# Patient Record
Sex: MALE | Race: BLACK OR AFRICAN AMERICAN
[De-identification: names, ages, dates, MRNs, and addresses within clinical notes are randomized per-mention and may not be internally consistent; named-entity substitution may affect disease eponyms.]

---

## 2017-10-27 ENCOUNTER — HOSPITAL ENCOUNTER (INPATIENT)
Dept: HOSPITAL 72 - EMR | Age: 82
LOS: 4 days | Discharge: NURSING, REHAB OR LONG-TERM CARE | DRG: 872 | End: 2017-10-31
Payer: MEDICARE

## 2017-10-27 VITALS — SYSTOLIC BLOOD PRESSURE: 119 MMHG | DIASTOLIC BLOOD PRESSURE: 71 MMHG

## 2017-10-27 VITALS — BODY MASS INDEX: 22.51 KG/M2 | WEIGHT: 152 LBS | HEIGHT: 69 IN

## 2017-10-27 DIAGNOSIS — E78.5: ICD-10-CM

## 2017-10-27 DIAGNOSIS — L03.115: ICD-10-CM

## 2017-10-27 DIAGNOSIS — F03.90: ICD-10-CM

## 2017-10-27 DIAGNOSIS — Z79.02: ICD-10-CM

## 2017-10-27 DIAGNOSIS — L03.116: ICD-10-CM

## 2017-10-27 DIAGNOSIS — A41.9: Primary | ICD-10-CM

## 2017-10-27 DIAGNOSIS — L98.8: ICD-10-CM

## 2017-10-27 DIAGNOSIS — H91.90: ICD-10-CM

## 2017-10-27 DIAGNOSIS — I73.9: ICD-10-CM

## 2017-10-27 DIAGNOSIS — I11.9: ICD-10-CM

## 2017-10-27 PROCEDURE — 99285 EMERGENCY DEPT VISIT HI MDM: CPT

## 2017-10-27 PROCEDURE — 93970 EXTREMITY STUDY: CPT

## 2017-10-27 PROCEDURE — 93925 LOWER EXTREMITY STUDY: CPT

## 2017-10-27 PROCEDURE — 85025 COMPLETE CBC W/AUTO DIFF WBC: CPT

## 2017-10-27 PROCEDURE — 36415 COLL VENOUS BLD VENIPUNCTURE: CPT

## 2017-10-27 PROCEDURE — 80048 BASIC METABOLIC PNL TOTAL CA: CPT

## 2017-10-27 PROCEDURE — 82550 ASSAY OF CK (CPK): CPT

## 2017-10-27 PROCEDURE — 87040 BLOOD CULTURE FOR BACTERIA: CPT

## 2017-10-27 PROCEDURE — 84484 ASSAY OF TROPONIN QUANT: CPT

## 2017-10-27 PROCEDURE — 74176 CT ABD & PELVIS W/O CONTRAST: CPT

## 2017-10-27 PROCEDURE — 83605 ASSAY OF LACTIC ACID: CPT

## 2017-10-27 PROCEDURE — 85730 THROMBOPLASTIN TIME PARTIAL: CPT

## 2017-10-27 PROCEDURE — 71010: CPT

## 2017-10-27 PROCEDURE — 80053 COMPREHEN METABOLIC PANEL: CPT

## 2017-10-27 PROCEDURE — 93005 ELECTROCARDIOGRAM TRACING: CPT

## 2017-10-27 PROCEDURE — 81003 URINALYSIS AUTO W/O SCOPE: CPT

## 2017-10-27 PROCEDURE — 80202 ASSAY OF VANCOMYCIN: CPT

## 2017-10-27 PROCEDURE — 78266 GSTR EMPT IMG SM BWL&COLON: CPT

## 2017-10-27 PROCEDURE — 85610 PROTHROMBIN TIME: CPT

## 2017-10-27 PROCEDURE — 82553 CREATINE MB FRACTION: CPT

## 2017-10-28 VITALS — DIASTOLIC BLOOD PRESSURE: 72 MMHG | SYSTOLIC BLOOD PRESSURE: 128 MMHG

## 2017-10-28 VITALS — SYSTOLIC BLOOD PRESSURE: 132 MMHG | DIASTOLIC BLOOD PRESSURE: 88 MMHG

## 2017-10-28 VITALS — DIASTOLIC BLOOD PRESSURE: 85 MMHG | SYSTOLIC BLOOD PRESSURE: 129 MMHG

## 2017-10-28 VITALS — SYSTOLIC BLOOD PRESSURE: 135 MMHG | DIASTOLIC BLOOD PRESSURE: 59 MMHG

## 2017-10-28 VITALS — DIASTOLIC BLOOD PRESSURE: 48 MMHG | SYSTOLIC BLOOD PRESSURE: 119 MMHG

## 2017-10-28 VITALS — SYSTOLIC BLOOD PRESSURE: 109 MMHG | DIASTOLIC BLOOD PRESSURE: 52 MMHG

## 2017-10-28 LAB
ADD MANUAL DIFF: NO
ALBUMIN SERPL-MCNC: 2.5 G/DL (ref 3.4–5)
ALBUMIN/GLOB SERPL: 0.5 {RATIO} (ref 1–2.7)
ALP SERPL-CCNC: 85 U/L (ref 46–116)
ALT SERPL-CCNC: 30 U/L (ref 12–78)
ANION GAP SERPL CALC-SCNC: 11 MMOL/L (ref 5–15)
APPEARANCE UR: (no result)
APTT BLD: 31 SEC (ref 23–33)
APTT PPP: YELLOW S
AST SERPL-CCNC: 21 U/L (ref 15–37)
BASOPHILS NFR BLD AUTO: 1.5 % (ref 0–2)
BILIRUB SERPL-MCNC: 0.6 MG/DL (ref 0.2–1)
BUN SERPL-MCNC: 1 MG/DL (ref 7–18)
CALCIUM SERPL-MCNC: 9.4 MG/DL (ref 8.5–10.1)
CHLORIDE SERPL-SCNC: 107 MMOL/L (ref 98–107)
CK MB SERPL-MCNC: < 0.5 NG/ML (ref 0–3.6)
CK SERPL-CCNC: 28 U/L (ref 26–308)
CO2 SERPL-SCNC: 26 MMOL/L (ref 21–32)
CREAT SERPL-MCNC: 1.8 MG/DL (ref 0.55–1.3)
EOSINOPHIL NFR BLD AUTO: 2.7 % (ref 0–3)
ERYTHROCYTE [DISTWIDTH] IN BLOOD BY AUTOMATED COUNT: 13.1 % (ref 11.6–14.8)
GLOBULIN SER-MCNC: 5.4 G/DL
GLUCOSE UR STRIP-MCNC: NEGATIVE MG/DL
HCT VFR BLD CALC: 43.3 % (ref 42–52)
HGB BLD-MCNC: 15.2 G/DL (ref 14.2–18)
INR PPP: 1 (ref 0.9–1.1)
KETONES UR QL STRIP: (no result)
LEUKOCYTE ESTERASE UR QL STRIP: (no result)
LYMPHOCYTES NFR BLD AUTO: 32 % (ref 20–45)
MCV RBC AUTO: 89 FL (ref 80–99)
MONOCYTES NFR BLD AUTO: 7.2 % (ref 1–10)
NEUTROPHILS NFR BLD AUTO: 56.6 % (ref 45–75)
NITRITE UR QL STRIP: NEGATIVE
PH UR STRIP: 5 [PH] (ref 4.5–8)
PLATELET # BLD: 319 K/UL (ref 150–450)
POTASSIUM SERPL-SCNC: 4.1 MMOL/L (ref 3.5–5.1)
PROT UR QL STRIP: (no result)
RBC # BLD AUTO: 4.88 M/UL (ref 4.7–6.1)
SODIUM SERPL-SCNC: 143 MMOL/L (ref 136–145)
SP GR UR STRIP: 1.02 (ref 1–1.03)
UROBILINOGEN UR-MCNC: 1 MG/DL (ref 0–1)
WBC # BLD AUTO: 11.3 K/UL (ref 4.8–10.8)

## 2017-10-28 RX ADMIN — DOCUSATE SODIUM SCH MG: 100 CAPSULE, LIQUID FILLED ORAL at 09:00

## 2017-10-28 RX ADMIN — DOCUSATE SODIUM SCH MG: 100 CAPSULE, LIQUID FILLED ORAL at 17:20

## 2017-10-28 RX ADMIN — ASPIRIN SCH MG: 81 TABLET, DELAYED RELEASE ORAL at 09:00

## 2017-10-28 RX ADMIN — HEPARIN SODIUM SCH UNITS: 5000 INJECTION INTRAVENOUS; SUBCUTANEOUS at 09:00

## 2017-10-28 RX ADMIN — MEMANTINE HYDROCHLORIDE SCH MG: 10 TABLET ORAL at 09:00

## 2017-10-28 RX ADMIN — DONEPEZIL HYDROCHLORIDE SCH MG: 10 TABLET, FILM COATED ORAL at 09:00

## 2017-10-28 RX ADMIN — HEPARIN SODIUM SCH UNITS: 5000 INJECTION INTRAVENOUS; SUBCUTANEOUS at 20:53

## 2017-10-28 NOTE — DIAGNOSTIC IMAGING REPORT
Indication: Abdominal pain



Technique: Spiral acquisitions obtained through the abdomen and pelvis. No oral

contrast utilized, per emergency room physician request No IV contrast utilized,

per referring physician request.. Multiplanar reconstructions were generated. Total

dose length product 630 mGycm. CTDIvol(s) 12 mGy. Dose reduction achieved using

automated exposure control





Comparison: None



Findings: There is extensive colonic diverticulosis. No evidence of 

diverticulitis.

Normal appendix. There is a lower mid abdominal small bowel small bowel 

anastomosis,

associated loops are dilated. No other small bowel distention demonstrated. There is

a broad-based umbilical or incisional hernia centrally. No free or 

loculated

intraperitoneal air or fluid. Distal esophagus, stomach, duodenum are unremarkable.

There is a small fat-containing left inguinal hernia.



The gallbladder contains a gallstone. There is no biliary ductal dilatation. Lack of

IV contrast limits assessment of the solid organs. The liver, pancreas, spleen,

adrenals right kidney are unremarkable. Left kidney demonstrates a 2 mm

nonobstructive upper pole calyceal calculus. No ureteral calculi. No retroperitoneal

or mesenteric mass or adenopathy. No pelvic mass or adenopathy.



The included lung bases demonstrate some basilar scarring. There are incompletely

united subacute appearing left ninth and 10th rib fracture deformities. There 

are

degenerative spondylosis changes. There are atherosclerotic vascular calcifications



Impression: No acute abnormality



Diverticulosis. No evidence of diverticulitis



Evidence of prior small bowel surgery. Focal dilatation at the anastomosis,

presumably due to focal altered motility related to the prior bowel resection



Cholelithiasis



Nonobstructive left upper pole intrarenal calyceal calculus



Subacute incompletely healed left rib fractures



Other findings as noted, including atherosclerosis, basilar pulmonary 

parenchymal

scarring, degenerative spondylosis, midline incisional/umbilical fat-containing

hernias, left inguinal fat-containing hernia



This agrees with the preliminary interpretation provided overnight by 

[facility]

teleradiology service.



The CT scanner at [facility] is accredited by the American College 

of

Radiology and the scans are performed using protocols designed to limit 

radiation

exposure to as low as reasonably achievable to attain images of sufficient

resolution adequate for diagnostic evaluation.

## 2017-10-28 NOTE — DIAGNOSTIC IMAGING REPORT
Indication: SOB



Technique: One view of the chest



Comparison: none



Findings: Lungs and pleural spaces are clear. Heart size is normal



Impression: No acute process

## 2017-10-28 NOTE — EMERGENCY ROOM REPORT
History of Present Illness


General


Chief Complaint:  Fever


Source:  Medical Record





Present Illness


HPI


This is an 85-year-old male with multiple medical problem.  He resides in a 

nursing home.  He was brought in for chief complaint of fever and abdominal 

pain with nausea vomiting.  Onset today.  History is from the nursing home 

note.  Patient has history of dementia so history is limited.  He received 

Tylenol for his fever.  No mention of diarrhea.


Allergies:  


Coded Allergies:  


     No Known Allergies (Unverified , 10/27/17)





Patient History


Past Medical History:  see triage record, old chart reviewed


Past Surgical History:  other


Pertinent Family History:  none


Social History:  Denies: smoking


Immunizations:  other


Reviewed Nursing Documentation:  PMH: Agreed, PSxH: Agreed





Nursing Documentation-PMH


Hx Hypertension:  Yes - Hyperlipidemia





Review of Systems


Constitutional:  Reports: fever


Gastrointestinal:  Reports: abdominal pain, nausea, vomiting


All Other Systems:  limited - Secondary to dementia





Physical Exam





Vital Signs








  Date Time  Temp Pulse Resp B/P (MAP) Pulse Ox O2 Delivery O2 Flow Rate FiO2


 


10/27/17 22:59 100.0 96 16 119/71 98 Room Air  





vitals with fever


Sp02 EP Interpretation:  reviewed, normal


General Appearance:  well appearing, no apparent distress, alert


Head:  normocephalic, atraumatic


Eyes:  bilateral eye PERRL, bilateral eye EOMI


ENT:  hearing grossly normal, normal pharynx


Neck:  full range of motion, supple, no meningismus


Respiratory:  chest non-tender, lungs clear, normal breath sounds


Cardiovascular #1:  regular rate, rhythm, no murmur


Gastrointestinal:  normal bowel sounds, non tender, no mass, no organomegaly, 

no bruit, non-distended


Musculoskeletal:  back normal, normal range of motion


Neurologic:  alert


Psychiatric:  mood/affect normal


Skin:  warm/dry





Medical Decision Making


Diagnostic Impression:  


 Primary Impression:  


 Fever


 Qualified Codes:  R50.9 - Fever, unspecified


 Additional Impressions:  


 SIRS (systemic inflammatory response syndrome)


 Proteinuria


 Qualified Codes:  R80.9 - Proteinuria, unspecified


ER Course


Patient presents with a fever and systemic inflammatory response syndrome.  No 

clear source but most likely urine.  Initially urine is equivocal.  I will go 

ahead and put him on antibiotics.  Mental status.  He is at baseline.  No 

evidence of pneumonia, acute abdomen to name a few.


Lab Results Impression


labs unremarkable


EKG Diagnostic Results


Rate:  normal


Rhythm:  NSR


ST Segments:  no acute changes





Rhythm Strip Diag. Results


Rhythm Strip Time:  00:01


EP Interpretation:  yes


Rate:  77


Rhythm:  NSR, no PVC's, no ectopy





Chest X-Ray Diagnostic Results


Chest X-Ray Diagnostic Results :  


   Chest X-Ray Ordered:  Yes


   # of Views/Limited/Complete:  1 View


   Indication:  Shortness of Breath


   EP Interpretation:  Yes


   Interpretation:  no consolidation, no effusion, no pneumothorax


   Impression:  No acute disease


   Electronically Signed by:  Electronically signed by Janak Espinal MD





CT/MRI/US Diagnostic Results


CT/MRI/US Diagnostic Results :  


   Imaging Test Ordered:  CT abdomen and pelvis


   Impression


Read by radiologist.  No acute process.





Last Vital Signs








  Date Time  Temp Pulse Resp B/P (MAP) Pulse Ox O2 Delivery O2 Flow Rate FiO2


 


10/27/17 22:59 100.0 96 16 119/71 98 Room Air  








Status:  improved


Disposition:  ADMITTED AS INPATIENT


Condition:  Serious











JANAK ESPINAL M.D. Oct 28, 2017 00:02

## 2017-10-28 NOTE — HISTORY AND PHYSICAL REPORT
DATE OF ADMISSION:  10/28/2017



CHIEF COMPLAINT:  Fever.



HISTORY OF PRESENT ILLNESS:  This is an 85-year-old male, admitted from

[facility].  The patient developed a fever in the nursing home.  The

patient is a poor historian due to organic brain syndrome and profound

deafness.  The patient just returned from another admission several weeks

ago at [facility] where he had failure to

thrive and poor oral intake.  He was noted to have severe peripheral

vascular disease and left lower extremity cellulitis.  The patient

underwent the angioplasty to his left femoral arterial system with good

results.



PAST MEDICAL HISTORY:

1. Organic brain syndrome.

2. Hypertensive cardiovascular disease.

3. Deafness.

4. Hyperlipidemia.



MEDICATIONS:  Tylenol p.r.n., baby aspirin, Plavix, Depakote, sodium

docusate, Aricept, subcutaneous heparin, lorazepam p.r.n., magnesium

oxide, Namenda, multivitamins, Protonix, Zosyn, pravastatin, and

vancomycin.



ALLERGIES:  No known drug allergies.



SOCIAL HISTORY:  Unable to obtain secondary to mental status.



FAMILY HISTORY:  Unable to obtain secondary to mental status.



REVIEW OF SYSTEMS:  Unable to obtain secondary to mental status.



PHYSICAL EXAMINATION:

GENERAL:  This is an elderly  male, who is in no acute

distress.

VITAL SIGNS:  Blood pressure 129/85, pulse 76 and regular, respirations 14,

and temperature initially 100 degrees and later on today 98.7 degrees.

HEENT:  Head is normocephalic and atraumatic.  Pupils are equal, round, and

reactive to light and accommodation consensually.

NECK:  Supple.  Trachea midline.  There was no lymphadenopathy or

thyromegaly.

LUNGS:  Clear to auscultation and percussion.

HEART:  Regular rate and rhythm without rubs, murmurs, or gallops.

ABDOMEN:  Soft and nontender.  Bowel sounds were active.

EXTREMITIES:  No clubbing.  He has bilateral lower extremity edema.

NEUROLOGICAL:  He is confused.  He is extremely hard of hearing.  There are

no gross lateralizing signs.



LABORATORY AND ANCILLARY DATA:  White count 11,300, otherwise, within

normal limits.  Serum chemistry, creatinine 1.8.  Albumin 2.5.

Urinalysis, 3+ protein, otherwise unremarkable.



ASSESSMENT:  Fever, unknown etiology.



Of note is that a CT scan of abdomen shows gallstones.



PLAN:

1. Broad-spectrum IV antibiotics.

2. Rule out C. difficile colitis.

3. HIDA scan.

4. Consider ID consult.









  ______________________________________________

  Dat Greene M.D.





DR:  Tonio

D:  10/28/2017 12:38

T:  10/28/2017 22:46

JOB#:  [id number]

CC:

## 2017-10-28 NOTE — DIAGNOSTIC IMAGING REPORT
Indication: Abdominal pain



Technique: Spiral acquisitions obtained through the abdomen and pelvis. No oral

contrast utilized, per emergency room physician request No IV contrast utilized,

per referring physician request.. Multiplanar reconstructions were generated. Total

dose length product 630 mGycm. CTDIvol(s) 12 mGy. Dose reduction achieved using

automated exposure control





Comparison: None



Findings: There is extensive colonic diverticulosis. No evidence of 

diverticulitis.

Normal appendix. There is a lower mid abdominal small bowel small bowel 

anastomosis,

associated loops are dilated. No other small bowel distention demonstrated. There is

a broad-based umbilical or incisional hernia centrally. No free or 

loculated

intraperitoneal air or fluid. Distal esophagus, stomach, duodenum are unremarkable.

There is a small fat-containing left inguinal hernia.



The gallbladder contains a gallstone. There is no biliary ductal dilatation. Lack of

IV contrast limits assessment of the solid organs. The liver, pancreas, spleen,

adrenals right kidney are unremarkable. Left kidney demonstrates a 2 mm

nonobstructive upper pole calyceal calculus. No ureteral calculi. No retroperitoneal

or mesenteric mass or adenopathy. No pelvic mass or adenopathy.



The included lung bases demonstrate some basilar scarring. There are incompletely

united subacute appearing left ninth and 10th rib fracture deformities. There 

are

degenerative spondylosis changes. There are atherosclerotic vascular calcifications



Impression: No acute abnormality



Diverticulosis. No evidence of diverticulitis



Evidence of prior small bowel surgery. Focal dilatation at the anastomosis,

presumably due to focal altered motility related to the prior bowel resection



Cholelithiasis



Nonobstructive left upper pole intrarenal calyceal calculus



Subacute incompletely healed left rib fractures



Other findings as noted, including atherosclerosis, basilar pulmonary 

parenchymal

scarring, degenerative spondylosis, midline incisional/umbilical fat-containing

hernias, left inguinal fat-containing hernia



This agrees with the preliminary interpretation provided overnight by 

[facility]

teleradiology service.



The CT scanner at [facility] is accredited by the American College 

of

Radiology and the scans are performed using protocols designed to limit 

radiation

exposure to as low as reasonably achievable to attain images of sufficient

resolution adequate for diagnostic evaluation.

## 2017-10-28 NOTE — HISTORY AND PHYSICAL REPORT
DATE OF ADMISSION:  10/28/2017



CHIEF COMPLAINT:  Fever.



HISTORY OF PRESENT ILLNESS:  This is an 85-year-old male, admitted from

[facility].  The patient developed a fever in the nursing home.  The

patient is a poor historian due to organic brain syndrome and profound

deafness.  The patient just returned from another admission several weeks

ago at [facility] where he had failure to

thrive and poor oral intake.  He was noted to have severe peripheral

vascular disease and left lower extremity cellulitis.  The patient

underwent the angioplasty to his left femoral arterial system with good

results.



PAST MEDICAL HISTORY:

1. Organic brain syndrome.

2. Hypertensive cardiovascular disease.

3. Deafness.

4. Hyperlipidemia.



MEDICATIONS:  Tylenol p.r.n., baby aspirin, Plavix, Depakote, sodium

docusate, Aricept, subcutaneous heparin, lorazepam p.r.n., magnesium

oxide, Namenda, multivitamins, Protonix, Zosyn, pravastatin, and

vancomycin.



ALLERGIES:  No known drug allergies.



SOCIAL HISTORY:  Unable to obtain secondary to mental status.



FAMILY HISTORY:  Unable to obtain secondary to mental status.



REVIEW OF SYSTEMS:  Unable to obtain secondary to mental status.



PHYSICAL EXAMINATION:

GENERAL:  This is an elderly  male, who is in no acute

distress.

VITAL SIGNS:  Blood pressure 129/85, pulse 76 and regular, respirations 14,

and temperature initially 100 degrees and later on today 98.7 degrees.

HEENT:  Head is normocephalic and atraumatic.  Pupils are equal, round, and

reactive to light and accommodation consensually.

NECK:  Supple.  Trachea midline.  There was no lymphadenopathy or

thyromegaly.

LUNGS:  Clear to auscultation and percussion.

HEART:  Regular rate and rhythm without rubs, murmurs, or gallops.

ABDOMEN:  Soft and nontender.  Bowel sounds were active.

EXTREMITIES:  No clubbing.  He has bilateral lower extremity edema.

NEUROLOGICAL:  He is confused.  He is extremely hard of hearing.  There are

no gross lateralizing signs.



LABORATORY AND ANCILLARY DATA:  White count 11,300, otherwise, within

normal limits.  Serum chemistry, creatinine 1.8.  Albumin 2.5.

Urinalysis, 3+ protein, otherwise unremarkable.



ASSESSMENT:  Fever, unknown etiology.



Of note is that a CT scan of abdomen shows gallstones.



PLAN:

1. Broad-spectrum IV antibiotics.

2. Rule out C. difficile colitis.

3. HIDA scan.

4. Consider ID consult.









  ______________________________________________

  Dat Greene M.D.





DR:  Tonio

D:  10/28/2017 12:38

T:  10/28/2017 22:46

JOB#:  [id number]

CC:

## 2017-10-28 NOTE — DIAGNOSTIC IMAGING REPORT
Indication: Abdominal pain



Technique: Spiral acquisitions obtained through the abdomen and pelvis. No oral

contrast utilized, per emergency room physician request No IV contrast utilized,

per referring physician request.. Multiplanar reconstructions were generated. Total

dose length product 630 mGycm. CTDIvol(s) 12 mGy. Dose reduction achieved using

automated exposure control





Comparison: None



Findings: There is extensive colonic diverticulosis. No evidence of 

diverticulitis.

Normal appendix. There is a lower mid abdominal small bowel small bowel 

anastomosis,

associated loops are dilated. No other small bowel distention demonstrated. There is

a broad-based umbilical or incisional hernia centrally. No free or 

loculated

intraperitoneal air or fluid. Distal esophagus, stomach, duodenum are unremarkable.

There is a small fat-containing left inguinal hernia.



The gallbladder contains a gallstone. There is no biliary ductal dilatation. Lack of

IV contrast limits assessment of the solid organs. The liver, pancreas, spleen,

adrenals right kidney are unremarkable. Left kidney demonstrates a 2 mm

nonobstructive upper pole calyceal calculus. No ureteral calculi. No retroperitoneal

or mesenteric mass or adenopathy. No pelvic mass or adenopathy.



The included lung bases demonstrate some basilar scarring. There are incompletely

united subacute appearing left ninth and 10th rib fracture deformities. There 

are

degenerative spondylosis changes. There are atherosclerotic vascular calcifications



Impression: No acute abnormality



Diverticulosis. No evidence of diverticulitis



Evidence of prior small bowel surgery. Focal dilatation at the anastomosis,

presumably due to focal altered motility related to the prior bowel resection



Cholelithiasis



Nonobstructive left upper pole intrarenal calyceal calculus



Subacute incompletely healed left rib fractures



Other findings as noted, including atherosclerosis, basilar pulmonary 

parenchymal

scarring, degenerative spondylosis, midline incisional/umbilical fat-containing

hernias, left inguinal fat-containing hernia



This agrees with the preliminary interpretation provided overnight by 

[facility]

teleradiology service.



The CT scanner at [facility] is accredited by the American College 

of

Radiology and the scans are performed using protocols designed to limit 

radiation

exposure to as low as reasonably achievable to attain images of sufficient

resolution adequate for diagnostic evaluation.

## 2017-10-28 NOTE — HISTORY AND PHYSICAL REPORT
DATE OF ADMISSION:  10/28/2017



CHIEF COMPLAINT:  Fever.



HISTORY OF PRESENT ILLNESS:  This is an 85-year-old male, admitted from

[facility].  The patient developed a fever in the nursing home.  The

patient is a poor historian due to organic brain syndrome and profound

deafness.  The patient just returned from another admission several weeks

ago at [facility] where he had failure to

thrive and poor oral intake.  He was noted to have severe peripheral

vascular disease and left lower extremity cellulitis.  The patient

underwent the angioplasty to his left femoral arterial system with good

results.



PAST MEDICAL HISTORY:

1. Organic brain syndrome.

2. Hypertensive cardiovascular disease.

3. Deafness.

4. Hyperlipidemia.



MEDICATIONS:  Tylenol p.r.n., baby aspirin, Plavix, Depakote, sodium

docusate, Aricept, subcutaneous heparin, lorazepam p.r.n., magnesium

oxide, Namenda, multivitamins, Protonix, Zosyn, pravastatin, and

vancomycin.



ALLERGIES:  No known drug allergies.



SOCIAL HISTORY:  Unable to obtain secondary to mental status.



FAMILY HISTORY:  Unable to obtain secondary to mental status.



REVIEW OF SYSTEMS:  Unable to obtain secondary to mental status.



PHYSICAL EXAMINATION:

GENERAL:  This is an elderly  male, who is in no acute

distress.

VITAL SIGNS:  Blood pressure 129/85, pulse 76 and regular, respirations 14,

and temperature initially 100 degrees and later on today 98.7 degrees.

HEENT:  Head is normocephalic and atraumatic.  Pupils are equal, round, and

reactive to light and accommodation consensually.

NECK:  Supple.  Trachea midline.  There was no lymphadenopathy or

thyromegaly.

LUNGS:  Clear to auscultation and percussion.

HEART:  Regular rate and rhythm without rubs, murmurs, or gallops.

ABDOMEN:  Soft and nontender.  Bowel sounds were active.

EXTREMITIES:  No clubbing.  He has bilateral lower extremity edema.

NEUROLOGICAL:  He is confused.  He is extremely hard of hearing.  There are

no gross lateralizing signs.



LABORATORY AND ANCILLARY DATA:  White count 11,300, otherwise, within

normal limits.  Serum chemistry, creatinine 1.8.  Albumin 2.5.

Urinalysis, 3+ protein, otherwise unremarkable.



ASSESSMENT:  Fever, unknown etiology.



Of note is that a CT scan of abdomen shows gallstones.



PLAN:

1. Broad-spectrum IV antibiotics.

2. Rule out C. difficile colitis.

3. HIDA scan.

4. Consider ID consult.









  ______________________________________________

  Dat Greene M.D.





DR:  Tonio

D:  10/28/2017 12:38

T:  10/28/2017 22:46

JOB#:  [id number]

CC:

## 2017-10-29 VITALS — DIASTOLIC BLOOD PRESSURE: 65 MMHG | SYSTOLIC BLOOD PRESSURE: 135 MMHG

## 2017-10-29 VITALS — SYSTOLIC BLOOD PRESSURE: 145 MMHG | DIASTOLIC BLOOD PRESSURE: 71 MMHG

## 2017-10-29 VITALS — DIASTOLIC BLOOD PRESSURE: 60 MMHG | SYSTOLIC BLOOD PRESSURE: 136 MMHG

## 2017-10-29 VITALS — SYSTOLIC BLOOD PRESSURE: 134 MMHG | DIASTOLIC BLOOD PRESSURE: 64 MMHG

## 2017-10-29 VITALS — DIASTOLIC BLOOD PRESSURE: 53 MMHG | SYSTOLIC BLOOD PRESSURE: 109 MMHG

## 2017-10-29 VITALS — DIASTOLIC BLOOD PRESSURE: 66 MMHG | SYSTOLIC BLOOD PRESSURE: 123 MMHG

## 2017-10-29 RX ADMIN — DONEPEZIL HYDROCHLORIDE SCH MG: 10 TABLET, FILM COATED ORAL at 08:14

## 2017-10-29 RX ADMIN — SODIUM CHLORIDE SCH MLS/HR: 0.9 INJECTION INTRAVENOUS at 11:30

## 2017-10-29 RX ADMIN — DOCUSATE SODIUM SCH MG: 100 CAPSULE, LIQUID FILLED ORAL at 08:14

## 2017-10-29 RX ADMIN — DOCUSATE SODIUM SCH MG: 100 CAPSULE, LIQUID FILLED ORAL at 17:31

## 2017-10-29 RX ADMIN — ASPIRIN SCH MG: 81 TABLET, DELAYED RELEASE ORAL at 08:14

## 2017-10-29 RX ADMIN — HEPARIN SODIUM SCH UNITS: 5000 INJECTION INTRAVENOUS; SUBCUTANEOUS at 20:59

## 2017-10-29 RX ADMIN — MEMANTINE HYDROCHLORIDE SCH MG: 10 TABLET ORAL at 08:13

## 2017-10-29 RX ADMIN — HEPARIN SODIUM SCH UNITS: 5000 INJECTION INTRAVENOUS; SUBCUTANEOUS at 08:19

## 2017-10-29 NOTE — CONSULTATION
DATE OF CONSULTATION:  10/29/2017



INFECTIOUS DISEASES CONSULTATION



ATTENDING PHYSICIAN:  Dat Greene M.D.



REASON FOR CONSULTATION:  Bilateral leg cellulitis, right greater than

left, possible sepsis and SIRS, fevers, and leukocytosis.  The patient's

chief complaint coming into the hospital is sepsis.



HISTORY OF PRESENT ILLNESS:  This is an 85-year-old male who has a history

of organic brain syndrome, is hard of hearing, and the patient is not a

very good historian.  The patient presents from an outside facility, who

lives in nursing home with poor oral intake and failure to thrive.  The

patient was noted to have leukocytosis and fevers and elevated heart rate

over 90 and respiratory rate of 20, possible sepsis.  Clinically, the

patient has bilateral leg cellulitis, especially on the right leg.

Infectious Diseases consultation requested.  The patient was placed on

vancomycin and cefepime.  The patient does also have an elevated

creatinine.  MAR was noted.  Orders noted.  Notes were reviewed.

Urinalysis only had 0-2 white blood cells, and chest x-ray showed no acute

process.  Also, the patient had CT scan of the abdomen and pelvis, which

showed diverticulosis, but no diverticulitis, history of cholelithiasis.

 



PAST MEDICAL HISTORY:  The patient's medical history includes the

following.  The patient has a past medical history of organic brain

syndrome, history of hypertension, and hypertensive cardiovascular

disease.  He has deafness, hyperlipidemia.  He has elevated creatinine at

this time.  He has possible acute kidney injury versus chronic kidney

disease.  No history of diabetes mentioned.  Also, hypertension and

hyperlipidemia.  Please see past medical history in medical order.



MEDICATIONS:  Upon reviewing the MAR, he is on the following medications.

He is on vancomycin and cefepime.  He is on pravastatin.  He is on

heparin.  He is on aspirin.  He is on Plavix, divalproex, Colace, Aricept,

Mylanta.  He is on Namenda, multivitamins, Protonix, Tylenol, magnesium

hydroxide, and Ativan.



ALLERGIES:  No known drug or antibiotic allergies.



SOCIAL HISTORY:  Negative for smoking, alcohol, or drug abuse.



FAMILY HISTORY:  Noncontributory.  Negative for exposure to tuberculosis or

cancer.



REVIEW OF SYSTEMS:  CONSTITUTIONAL:  Denies weakness or fatigue.  He is

hard of hearing.  He has organic brain syndrome.  He did come in with

fevers.  No chills noted at this time.  HEAD AND NECK:  No head pain.  No

neck pain.  No neck stiffness.  CARDIAC:  No chest pain.

GASTROINTESTINAL:  No nausea, vomiting, or diarrhea.    GENITOURINARY:  No

Sr.  PULMONARY:  No significant congestion, short of breath,

secretions, or hemoptysis.    SKIN:  No rash or itching.  EXTREMITIES:  He

has bilateral leg swelling and pain.  NEUROLOGIC:  No seizures.



PHYSICAL EXAMINATION:

GENERAL:  Alert and responsive.  He is hard of hearing.

VITAL SIGNS:  Temperature is 97.9 degrees, pulse rate 87, respiratory rate

20, blood pressure 109/53, respiratory rate has been as high as 22, pulse

rate has been as high as 96, and temperature has been high as 100

degrees.

HEAD AND NECK:  Oral exam, no thrush.  Eye exam, no icterus.

Normocephalic.  No facial droop.

NECK:  Supple.  No neck stiffness.

HEART:  Regular.  No obvious gallop or murmur.

LUNGS:  Clear bilaterally.  No rhonchi or rales.

ABDOMEN:  Soft.  Positive bowel sounds.  Nontender.

SKIN:  No rash.

MUSCULOSKELETAL:  No effusion.  Bilateral legs have redness and warmth and

swelling, especially on the right versus left.

PERIPHERAL VASCULAR:  No cyanosis.

GENITOURINARY:  No Sr.

LINES:  IV sites are without phlebitis.  No CVA tenderness.

NEUROLOGIC:  Awake, alert, and responsive.



LABORATORY AND DIAGNOSTIC DATA:  Creatinine 1.8.  White count 11.3,

hemoglobin 15.2.  UA has 0-2 white blood cells.  Chest x-ray shows no

pneumonia.  CT scan of the abdomen and pelvis showed diverticulosis

without diverticulitis.  No abscess mentioned.  Cultures are pending

including blood cultures.



ASSESSMENT AND PLAN:

1. The patient has bilateral leg cellulitis on exam.  Clinically, he has

warmth and redness and swelling of both legs, especially the right.  The

patient has possible sepsis secondary to leg cellulitis with an elevated

white count, fevers, and also systemic inflammatory response syndrome

criteria.  Continue vancomycin and cefepime.  Check blood cultures and

follow labs.  Watch creatinine closely on vancomycin.  Continue vancomycin

and cefepime for bilateral leg cellulitis for now.

2. Elevated creatinine.

3. Hemoglobin stable.

4. Deafness.

5. Organic brain syndrome.

6. Hypertension.

7. Hypertensive heart disease.

8. Hyperlipidemia.

9. No allergies.

10. Social history negative.

11. MAR was noted.

12. Case discussed with RN.

13. Family history is noncontributory.

14. Continue treatment per Dr. Greene and consultants.

15. Skin care protocol.

16. Notes and records were noted.

17. Orders were entered and noted.









  ______________________________________________

  Pattie Boyer M.D.





DR:  KRISTOFER

D:  10/29/2017 11:27

T:  10/29/2017 20:40

JOB#:  [id number]

CC:

## 2017-10-29 NOTE — CONSULTATION
DATE OF CONSULTATION:  10/28/2017



VASCULAR SURGERY CONSULTATION



CONSULTING PHYSICIAN:  Joey Murillo M.D.



REFERRING PHYSICIAN:  Dat Pedro M.D.



REASON FOR CONSULTATION:  Right lower extremity cellulitis.



HISTORY OF PRESENT ILLNESS:  This is an 85-year-old  male

who is well known to our vascular service.  The patient suffers from

dementia, organic brain syndrome, and psychosis.  The patient suffers from

mixed arterial leg venous insufficiency.  The patient has undergone a

successful left leg percutaneous revascularization last month with

excellent results.  The patient now presents to [facility]

with right leg cellulitis and pain.  The patient does have venous arterial

insufficiency with history of stasis dermatitis as well as arterial

occlusive disease.  All the history is obtained from the medical records.

The patient is demented and psychotic.



PAST MEDICAL HISTORY:  As above, history of hypertension, organic brain

syndrome, psychosis, deafness, hyperlipidemia, mixed venous arterial

insufficiency, and prior history of left leg percutaneous

revascularization.



MEDICATIONS:  See attached MAR.



ALLERGIES:  No known drug allergies.



SOCIAL HISTORY:  Unobtainable.  He is a nursing home resident.  His power

of  is his sister and his granddaughter is nurse at [employer].



FAMILY HISTORY:  Unremarkable.



REVIEW OF SYSTEMS:  Unobtainable due to altered mental status.



PHYSICAL EXAMINATION:

GENERAL:  The patient is awake and alert, but confused.

VITAL SIGNS:  he is afebrile at 98. degrees, T-max is 100 degrees, heart

rate 76, blood pressure 129/85, and respirations 16.

NECK:  He has palpable radial pulses.  No carotid bruits.

LUNGS:  Clear to auscultation.

HEART:  Regular.

ABDOMEN:  Soft and nontender.

EXTREMITIES:  He has palpable femoral pulses, stronger left popliteal

pulse, stronger left foot Doppler signals and palpable left DP pulse.  Right 
foot is warm, has weaker

Dopplers.  He does have right lower extremity cellulitis in the anterior

lower leg with some tenderness.  There is no evidence of open ulceration.

 



LABORATORY AND DIAGNOSTIC DATA:  Revealed a WBC of 9.3.  Creatinine is 1.8.

 



IMPRESSION:

1. Right lower extremity cellulitis with history of mixed arteriovenous

insufficiency with leukocytosis and low-grade fever.

2. History of dementia.

3. Psychosis.

4. Organic brain syndrome.

5. History of hypertension and renal failure.

6. Prior history of left leg successful percutaneous revascularization

with resolution of symptoms.



RECOMMENDATION:

1. Antibiotics per Infectious Disease service.

2. Continue DVT and decubitus precautions.

3. We will obtain lower extremity duplex.  Once the patient is medically

optimized and cleared, the patient will require contralateral right leg

revascularization and angiography as well.  



The above is discussed at length with the patient's nurse.









  ______________________________________________

  Joey Murillo M.D.





DR:  DEWAYNE

D:  10/29/2017 17:50

T:  10/29/2017 19:33

JOB#:  [id number]

CC:  

Joey Murillo M.D.; Fax#:  [phone]

DAT PEDRO M.D.  ; FAX#:  [phone]



[facility]UNDAY

## 2017-10-29 NOTE — INFECTIOUS DISEASES PROG NOTE
Assessment/Plan


Assessment/Plan








Full consult dictated:





A)


   1) bilateral leg cellulitis - R > L


   2) possible sepsis, sirs, leukocytosis and fevers


   3) pmh noted


   4) allergies - negative





P)


   1) vancomycin, cefepime 


   2) check labs, bc


   3) thank you





Subjective


Allergies:  


Coded Allergies:  


     No Known Allergies (Unverified , 10/27/17)





Objective


Vital Signs





Last 24 Hour Vital Signs








  Date Time  Temp Pulse Resp B/P (MAP) Pulse Ox O2 Delivery O2 Flow Rate FiO2


 


10/29/17 08:00 97.9 87 20 109/53 100 Room Air  


 


10/29/17 04:00 97.9 90 20 135/65 99 Room Air  


 


10/29/17 00:00 98.1 89 20 136/60 98 Room Air  


 


10/28/17 21:00 98.4 93 20 135/59 99 Room Air  


 


10/28/17 16:00 98.4 78 22 128/72 98 Room Air  


 


10/28/17 12:00 98.4 82 20 132/88 98 Room Air  








Height (Feet):  5


Height (Inches):  9.00


Weight (Pounds):  152





Microbiology








 Date/Time


Source Procedure


Growth Status


 


 


 10/27/17 23:45


Blood Blood Culture - Preliminary


NO GROWTH AFTER 24 HOURS Resulted


 


 10/27/17 23:30


Blood Blood Culture - Preliminary


NO GROWTH AFTER 24 HOURS Resulted











Current Medications








 Medications


  (Trade)  Dose


 Ordered  Sig/Nimesh


 Route


 PRN Reason  Start Time


 Stop Time Status Last Admin


Dose Admin


 


 Acetaminophen


  (Tylenol)  500 mg  Q4H  PRN


 ORAL


 Mild Pain (Pain Scale 1-3)  10/28/17 08:00


 11/27/17 07:59   


 


 


 Acetaminophen


  (Tylenol)  650 mg  Q4H  PRN


 ORAL


 Moderate Pain/Temp > 100.5  10/28/17 08:00


 11/27/17 07:59   


 


 


 Al Hydroxide/Mg


 Hydroxide


  (Mylanta II)  30 ml  Q6H  PRN


 ORAL


 DYSPEPSIA/GI DISTRESS  10/28/17 09:00


 11/27/17 08:59   


 


 


 Aspirin


  (Ecotrin)  81 mg  DAILY


 ORAL


   10/28/17 09:00


 11/27/17 08:59  10/29/17 08:14


 


 


 Cefepime HCl 2 gm/


 Dextrose  110 ml @ 


 220 mls/hr  Q24H


 IVPB


   10/29/17 10:30


 11/5/17 10:29   


 


 


 Clopidogrel


 Bisulfate


  (Plavix)  75 mg  DAILY


 ORAL


   10/28/17 09:00


 11/27/17 08:59  10/29/17 08:14


 


 


 Divalproex Sodium


  (Depakote)  250 mg  Q12HR


 ORAL


   10/28/17 09:00


 11/27/17 08:59  10/29/17 08:15


 


 


 Docusate Sodium


  (Colace)  100 mg  TWICE A  DAY


 ORAL


   10/28/17 09:00


 11/27/17 08:59  10/29/17 08:14


 


 


 Donepezil HCl


  (Aricept)  10 mg  DAILY


 ORAL


   10/28/17 09:00


 11/27/17 08:59  10/29/17 08:14


 


 


 Heparin Sodium


  (Porcine)


  (Heparin 5000


 units/ml)  5,000 units  EVERY 12  HOURS


 SUBQ


   10/28/17 09:00


 11/27/17 08:59  10/29/17 08:19


 


 


 Lorazepam


  (Ativan)  1 mg  Q6H  PRN


 ORAL


 For Anxiety  10/28/17 07:45


 11/4/17 07:44   


 


 


 Magnesium


 Hydroxide


  (Mom)  30 ml  Q4H  PRN


 ORAL


 CONSTIPATION  10/28/17 08:00


 11/27/17 07:59   


 


 


 Memantine


  (Namenda)  10 mg  DAILY


 ORAL


   10/28/17 09:00


 11/27/17 08:59  10/29/17 08:13


 


 


 Multivitamins


  (Multivitamins)  1 tab  DAILY


 ORAL


   10/28/17 09:00


 11/27/17 08:59  10/29/17 08:14


 


 


 Pantoprazole


  (Protonix)  40 mg  DAILY


 ORAL


   10/28/17 09:00


 11/27/17 08:59  10/29/17 08:14


 


 


 Pravastatin Sodium


  (Pravachol)  10 mg  BEDTIME


 ORAL


   10/28/17 21:00


 11/27/17 20:59  10/28/17 20:51


 


 


 Vancomycin HCl


  (Vanco rx to


 dose)  1 ea  DAILY  PRN


 MISC


 Per rx protocol  10/29/17 09:15


 11/28/17 09:14   


 


 


 Vancomycin HCl/


 Dextrose  250 ml @ 


 166.667


 mls/hr  ONCE  ONCE


 IVPB


   10/29/17 11:00


 10/29/17 12:29   


 

















ARIAN FABIAN Oct 29, 2017 11:20

## 2017-10-29 NOTE — GENERAL PROGRESS NOTE
Progress Note


Progress Note


Patient seen and examined


Consult dictated #[id number]











CECE VILLATORO Oct 29, 2017 17:51

## 2017-10-29 NOTE — CARDIOLOGY REPORT
--------------- APPROVED REPORT --------------





EKG Measurement

Heart [id number]

FL 142P78

[id number]

[id number]

[id number]





Normal sinus rhythm

Normal ECG

## 2017-10-29 NOTE — GENERAL PROGRESS NOTE
Progress Note


Progress Note


Patient seen and examined


Consult dictated #[id number]











CECE VILLATORO Oct 29, 2017 17:51

## 2017-10-29 NOTE — CARDIOLOGY REPORT
--------------- APPROVED REPORT --------------





EKG Measurement

Heart [id number]

UT 142P78

[id number]

[id number]

[id number]





Normal sinus rhythm

Normal ECG

## 2017-10-29 NOTE — CARDIOLOGY REPORT
--------------- APPROVED REPORT --------------





EKG Measurement

Heart [id number]

MT 142P78

[id number]

[id number]

[id number]





Normal sinus rhythm

Normal ECG

## 2017-10-29 NOTE — GENERAL PROGRESS NOTE
Progress Note


Progress Note


Patient seen and examined


Consult dictated #[id number]











CECE VILLATORO Oct 29, 2017 17:51

## 2017-10-29 NOTE — CONSULTATION
DATE OF CONSULTATION:  10/28/2017



VASCULAR SURGERY CONSULTATION



CONSULTING PHYSICIAN:  Joey Murillo M.D.



REFERRING PHYSICIAN:  Dat Pedro M.D.



REASON FOR CONSULTATION:  Right lower extremity cellulitis.



HISTORY OF PRESENT ILLNESS:  This is an 85-year-old  male

who is well known to our vascular service.  The patient suffers from

dementia, organic brain syndrome, and psychosis.  The patient suffers from

mixed arterial leg venous insufficiency.  The patient has undergone a

successful left leg percutaneous revascularization last month with

excellent results.  The patient now presents to [facility]

with right leg cellulitis and pain.  The patient does have venous arterial

insufficiency with history of stasis dermatitis as well as arterial

occlusive disease.  All the history is obtained from the medical records.

The patient is demented and psychotic.



PAST MEDICAL HISTORY:  As above, history of hypertension, organic brain

syndrome, psychosis, deafness, hyperlipidemia, mixed venous arterial

insufficiency, and prior history of left leg percutaneous

revascularization.



MEDICATIONS:  See attached MAR.



ALLERGIES:  No known drug allergies.



SOCIAL HISTORY:  Unobtainable.  He is a nursing home resident.  His power

of  is his sister and his granddaughter is nurse at [employer].



FAMILY HISTORY:  Unremarkable.



REVIEW OF SYSTEMS:  Unobtainable due to altered mental status.



PHYSICAL EXAMINATION:

GENERAL:  The patient is awake and alert, but confused.

VITAL SIGNS:  he is afebrile at 98. degrees, T-max is 100 degrees, heart

rate 76, blood pressure 129/85, and respirations 16.

NECK:  He has palpable radial pulses.  No carotid bruits.

LUNGS:  Clear to auscultation.

HEART:  Regular.

ABDOMEN:  Soft and nontender.

EXTREMITIES:  He has palpable femoral pulses, stronger left popliteal

pulse, stronger left foot Doppler signals and palpable left DP pulse.  Right 
foot is warm, has weaker

Dopplers.  He does have right lower extremity cellulitis in the anterior

lower leg with some tenderness.  There is no evidence of open ulceration.

 



LABORATORY AND DIAGNOSTIC DATA:  Revealed a WBC of 9.3.  Creatinine is 1.8.

 



IMPRESSION:

1. Right lower extremity cellulitis with history of mixed arteriovenous

insufficiency with leukocytosis and low-grade fever.

2. History of dementia.

3. Psychosis.

4. Organic brain syndrome.

5. History of hypertension and renal failure.

6. Prior history of left leg successful percutaneous revascularization

with resolution of symptoms.



RECOMMENDATION:

1. Antibiotics per Infectious Disease service.

2. Continue DVT and decubitus precautions.

3. We will obtain lower extremity duplex.  Once the patient is medically

optimized and cleared, the patient will require contralateral right leg

revascularization and angiography as well.  



The above is discussed at length with the patient's nurse.









  ______________________________________________

  Joey Murillo M.D.





DR:  DEWAYNE

D:  10/29/2017 17:50

T:  10/29/2017 19:33

JOB#:  [id number]

CC:  

Joey Murillo M.D.; Fax#:  [phone]

DAT PEDRO M.D.  ; FAX#:  [phone]



[facility]

## 2017-10-29 NOTE — GENERAL PROGRESS NOTE
Assessment/Plan


Assessment/Plan


B LE cellulitis. This is the source of infection.


Podiadtry to advise re portal of entry.


ID to advise.





Subjective


Allergies:  


Coded Allergies:  


     No Known Allergies (Unverified , 10/27/17)


Subjective


Confused





Objective





Last 24 Hour Vital Signs








  Date Time  Temp Pulse Resp B/P (MAP) Pulse Ox O2 Delivery O2 Flow Rate FiO2


 


10/29/17 11:27 97.9 82 20 123/66 100 Room Air  


 


10/29/17 08:00 97.9 87 20 109/53 100 Room Air  


 


10/29/17 04:00 97.9 90 20 135/65 99 Room Air  


 


10/29/17 00:00 98.1 89 20 136/60 98 Room Air  


 


10/28/17 21:00 98.4 93 20 135/59 99 Room Air  


 


10/28/17 16:00 98.4 78 22 128/72 98 Room Air  








Height (Feet):  5


Height (Inches):  9.00


Weight (Pounds):  152


Objective


CV RR


Lungs CTA


Abd SNT. Bs +


E B Edema ++ cellulitis











MIRIAN PEDRO Oct 29, 2017 13:06

## 2017-10-29 NOTE — CONSULTATION
DATE OF CONSULTATION:  10/29/2017



INFECTIOUS DISEASES CONSULTATION



ATTENDING PHYSICIAN:  Dat Greene M.D.



REASON FOR CONSULTATION:  Bilateral leg cellulitis, right greater than

left, possible sepsis and SIRS, fevers, and leukocytosis.  The patient's

chief complaint coming into the hospital is sepsis.



HISTORY OF PRESENT ILLNESS:  This is an 85-year-old male who has a history

of organic brain syndrome, is hard of hearing, and the patient is not a

very good historian.  The patient presents from an outside facility, who

lives in nursing home with poor oral intake and failure to thrive.  The

patient was noted to have leukocytosis and fevers and elevated heart rate

over 90 and respiratory rate of 20, possible sepsis.  Clinically, the

patient has bilateral leg cellulitis, especially on the right leg.

Infectious Diseases consultation requested.  The patient was placed on

vancomycin and cefepime.  The patient does also have an elevated

creatinine.  MAR was noted.  Orders noted.  Notes were reviewed.

Urinalysis only had 0-2 white blood cells, and chest x-ray showed no acute

process.  Also, the patient had CT scan of the abdomen and pelvis, which

showed diverticulosis, but no diverticulitis, history of cholelithiasis.

 



PAST MEDICAL HISTORY:  The patient's medical history includes the

following.  The patient has a past medical history of organic brain

syndrome, history of hypertension, and hypertensive cardiovascular

disease.  He has deafness, hyperlipidemia.  He has elevated creatinine at

this time.  He has possible acute kidney injury versus chronic kidney

disease.  No history of diabetes mentioned.  Also, hypertension and

hyperlipidemia.  Please see past medical history in medical order.



MEDICATIONS:  Upon reviewing the MAR, he is on the following medications.

He is on vancomycin and cefepime.  He is on pravastatin.  He is on

heparin.  He is on aspirin.  He is on Plavix, divalproex, Colace, Aricept,

Mylanta.  He is on Namenda, multivitamins, Protonix, Tylenol, magnesium

hydroxide, and Ativan.



ALLERGIES:  No known drug or antibiotic allergies.



SOCIAL HISTORY:  Negative for smoking, alcohol, or drug abuse.



FAMILY HISTORY:  Noncontributory.  Negative for exposure to tuberculosis or

cancer.



REVIEW OF SYSTEMS:  CONSTITUTIONAL:  Denies weakness or fatigue.  He is

hard of hearing.  He has organic brain syndrome.  He did come in with

fevers.  No chills noted at this time.  HEAD AND NECK:  No head pain.  No

neck pain.  No neck stiffness.  CARDIAC:  No chest pain.

GASTROINTESTINAL:  No nausea, vomiting, or diarrhea.    GENITOURINARY:  No

Sr.  PULMONARY:  No significant congestion, short of breath,

secretions, or hemoptysis.    SKIN:  No rash or itching.  EXTREMITIES:  He

has bilateral leg swelling and pain.  NEUROLOGIC:  No seizures.



PHYSICAL EXAMINATION:

GENERAL:  Alert and responsive.  He is hard of hearing.

VITAL SIGNS:  Temperature is 97.9 degrees, pulse rate 87, respiratory rate

20, blood pressure 109/53, respiratory rate has been as high as 22, pulse

rate has been as high as 96, and temperature has been high as 100

degrees.

HEAD AND NECK:  Oral exam, no thrush.  Eye exam, no icterus.

Normocephalic.  No facial droop.

NECK:  Supple.  No neck stiffness.

HEART:  Regular.  No obvious gallop or murmur.

LUNGS:  Clear bilaterally.  No rhonchi or rales.

ABDOMEN:  Soft.  Positive bowel sounds.  Nontender.

SKIN:  No rash.

MUSCULOSKELETAL:  No effusion.  Bilateral legs have redness and warmth and

swelling, especially on the right versus left.

PERIPHERAL VASCULAR:  No cyanosis.

GENITOURINARY:  No Sr.

LINES:  IV sites are without phlebitis.  No CVA tenderness.

NEUROLOGIC:  Awake, alert, and responsive.



LABORATORY AND DIAGNOSTIC DATA:  Creatinine 1.8.  White count 11.3,

hemoglobin 15.2.  UA has 0-2 white blood cells.  Chest x-ray shows no

pneumonia.  CT scan of the abdomen and pelvis showed diverticulosis

without diverticulitis.  No abscess mentioned.  Cultures are pending

including blood cultures.



ASSESSMENT AND PLAN:

1. The patient has bilateral leg cellulitis on exam.  Clinically, he has

warmth and redness and swelling of both legs, especially the right.  The

patient has possible sepsis secondary to leg cellulitis with an elevated

white count, fevers, and also systemic inflammatory response syndrome

criteria.  Continue vancomycin and cefepime.  Check blood cultures and

follow labs.  Watch creatinine closely on vancomycin.  Continue vancomycin

and cefepime for bilateral leg cellulitis for now.

2. Elevated creatinine.

3. Hemoglobin stable.

4. Deafness.

5. Organic brain syndrome.

6. Hypertension.

7. Hypertensive heart disease.

8. Hyperlipidemia.

9. No allergies.

10. Social history negative.

11. MAR was noted.

12. Case discussed with RN.

13. Family history is noncontributory.

14. Continue treatment per Dr. Greene and consultants.

15. Skin care protocol.

16. Notes and records were noted.

17. Orders were entered and noted.









  ______________________________________________

  Pattie Boyer M.D.





DR:  KRISTOFER

D:  10/29/2017 11:27

T:  10/29/2017 20:40

JOB#:  [id number]

CC:

## 2017-10-29 NOTE — CONSULTATION
DATE OF CONSULTATION:  10/28/2017



VASCULAR SURGERY CONSULTATION



CONSULTING PHYSICIAN:  Joey Murillo M.D.



REFERRING PHYSICIAN:  Dat Pedro M.D.



REASON FOR CONSULTATION:  Right lower extremity cellulitis.



HISTORY OF PRESENT ILLNESS:  This is an 85-year-old  male

who is well known to our vascular service.  The patient suffers from

dementia, organic brain syndrome, and psychosis.  The patient suffers from

mixed arterial leg venous insufficiency.  The patient has undergone a

successful left leg percutaneous revascularization last month with

excellent results.  The patient now presents to [facility]

with right leg cellulitis and pain.  The patient does have venous arterial

insufficiency with history of stasis dermatitis as well as arterial

occlusive disease.  All the history is obtained from the medical records.

The patient is demented and psychotic.



PAST MEDICAL HISTORY:  As above, history of hypertension, organic brain

syndrome, psychosis, deafness, hyperlipidemia, mixed venous arterial

insufficiency, and prior history of left leg percutaneous

revascularization.



MEDICATIONS:  See attached MAR.



ALLERGIES:  No known drug allergies.



SOCIAL HISTORY:  Unobtainable.  He is a nursing home resident.  His power

of  is his sister and his granddaughter is nurse at [employer].



FAMILY HISTORY:  Unremarkable.



REVIEW OF SYSTEMS:  Unobtainable due to altered mental status.



PHYSICAL EXAMINATION:

GENERAL:  The patient is awake and alert, but confused.

VITAL SIGNS:  he is afebrile at 98. degrees, T-max is 100 degrees, heart

rate 76, blood pressure 129/85, and respirations 16.

NECK:  He has palpable radial pulses.  No carotid bruits.

LUNGS:  Clear to auscultation.

HEART:  Regular.

ABDOMEN:  Soft and nontender.

EXTREMITIES:  He has palpable femoral pulses, stronger left popliteal

pulse, stronger left foot Doppler signals and palpable left DP pulse.  Right 
foot is warm, has weaker

Dopplers.  He does have right lower extremity cellulitis in the anterior

lower leg with some tenderness.  There is no evidence of open ulceration.

 



LABORATORY AND DIAGNOSTIC DATA:  Revealed a WBC of 9.3.  Creatinine is 1.8.

 



IMPRESSION:

1. Right lower extremity cellulitis with history of mixed arteriovenous

insufficiency with leukocytosis and low-grade fever.

2. History of dementia.

3. Psychosis.

4. Organic brain syndrome.

5. History of hypertension and renal failure.

6. Prior history of left leg successful percutaneous revascularization

with resolution of symptoms.



RECOMMENDATION:

1. Antibiotics per Infectious Disease service.

2. Continue DVT and decubitus precautions.

3. We will obtain lower extremity duplex.  Once the patient is medically

optimized and cleared, the patient will require contralateral right leg

revascularization and angiography as well.  



The above is discussed at length with the patient's nurse.









  ______________________________________________

  Joey Murillo M.D.





DR:  DEWAYNE

D:  10/29/2017 17:50

T:  10/29/2017 19:33

JOB#:  [id number]

CC:  

Joey Murillo M.D.; Fax#:  [phone]

DAT PEDRO M.D.  ; FAX#:  [phone]



[facility]

## 2017-10-29 NOTE — CONSULTATION
DATE OF CONSULTATION:  10/29/2017



INFECTIOUS DISEASES CONSULTATION



ATTENDING PHYSICIAN:  Dat Greene M.D.



REASON FOR CONSULTATION:  Bilateral leg cellulitis, right greater than

left, possible sepsis and SIRS, fevers, and leukocytosis.  The patient's

chief complaint coming into the hospital is sepsis.



HISTORY OF PRESENT ILLNESS:  This is an 85-year-old male who has a history

of organic brain syndrome, is hard of hearing, and the patient is not a

very good historian.  The patient presents from an outside facility, who

lives in nursing home with poor oral intake and failure to thrive.  The

patient was noted to have leukocytosis and fevers and elevated heart rate

over 90 and respiratory rate of 20, possible sepsis.  Clinically, the

patient has bilateral leg cellulitis, especially on the right leg.

Infectious Diseases consultation requested.  The patient was placed on

vancomycin and cefepime.  The patient does also have an elevated

creatinine.  MAR was noted.  Orders noted.  Notes were reviewed.

Urinalysis only had 0-2 white blood cells, and chest x-ray showed no acute

process.  Also, the patient had CT scan of the abdomen and pelvis, which

showed diverticulosis, but no diverticulitis, history of cholelithiasis.

 



PAST MEDICAL HISTORY:  The patient's medical history includes the

following.  The patient has a past medical history of organic brain

syndrome, history of hypertension, and hypertensive cardiovascular

disease.  He has deafness, hyperlipidemia.  He has elevated creatinine at

this time.  He has possible acute kidney injury versus chronic kidney

disease.  No history of diabetes mentioned.  Also, hypertension and

hyperlipidemia.  Please see past medical history in medical order.



MEDICATIONS:  Upon reviewing the MAR, he is on the following medications.

He is on vancomycin and cefepime.  He is on pravastatin.  He is on

heparin.  He is on aspirin.  He is on Plavix, divalproex, Colace, Aricept,

Mylanta.  He is on Namenda, multivitamins, Protonix, Tylenol, magnesium

hydroxide, and Ativan.



ALLERGIES:  No known drug or antibiotic allergies.



SOCIAL HISTORY:  Negative for smoking, alcohol, or drug abuse.



FAMILY HISTORY:  Noncontributory.  Negative for exposure to tuberculosis or

cancer.



REVIEW OF SYSTEMS:  CONSTITUTIONAL:  Denies weakness or fatigue.  He is

hard of hearing.  He has organic brain syndrome.  He did come in with

fevers.  No chills noted at this time.  HEAD AND NECK:  No head pain.  No

neck pain.  No neck stiffness.  CARDIAC:  No chest pain.

GASTROINTESTINAL:  No nausea, vomiting, or diarrhea.    GENITOURINARY:  No

Sr.  PULMONARY:  No significant congestion, short of breath,

secretions, or hemoptysis.    SKIN:  No rash or itching.  EXTREMITIES:  He

has bilateral leg swelling and pain.  NEUROLOGIC:  No seizures.



PHYSICAL EXAMINATION:

GENERAL:  Alert and responsive.  He is hard of hearing.

VITAL SIGNS:  Temperature is 97.9 degrees, pulse rate 87, respiratory rate

20, blood pressure 109/53, respiratory rate has been as high as 22, pulse

rate has been as high as 96, and temperature has been high as 100

degrees.

HEAD AND NECK:  Oral exam, no thrush.  Eye exam, no icterus.

Normocephalic.  No facial droop.

NECK:  Supple.  No neck stiffness.

HEART:  Regular.  No obvious gallop or murmur.

LUNGS:  Clear bilaterally.  No rhonchi or rales.

ABDOMEN:  Soft.  Positive bowel sounds.  Nontender.

SKIN:  No rash.

MUSCULOSKELETAL:  No effusion.  Bilateral legs have redness and warmth and

swelling, especially on the right versus left.

PERIPHERAL VASCULAR:  No cyanosis.

GENITOURINARY:  No Sr.

LINES:  IV sites are without phlebitis.  No CVA tenderness.

NEUROLOGIC:  Awake, alert, and responsive.



LABORATORY AND DIAGNOSTIC DATA:  Creatinine 1.8.  White count 11.3,

hemoglobin 15.2.  UA has 0-2 white blood cells.  Chest x-ray shows no

pneumonia.  CT scan of the abdomen and pelvis showed diverticulosis

without diverticulitis.  No abscess mentioned.  Cultures are pending

including blood cultures.



ASSESSMENT AND PLAN:

1. The patient has bilateral leg cellulitis on exam.  Clinically, he has

warmth and redness and swelling of both legs, especially the right.  The

patient has possible sepsis secondary to leg cellulitis with an elevated

white count, fevers, and also systemic inflammatory response syndrome

criteria.  Continue vancomycin and cefepime.  Check blood cultures and

follow labs.  Watch creatinine closely on vancomycin.  Continue vancomycin

and cefepime for bilateral leg cellulitis for now.

2. Elevated creatinine.

3. Hemoglobin stable.

4. Deafness.

5. Organic brain syndrome.

6. Hypertension.

7. Hypertensive heart disease.

8. Hyperlipidemia.

9. No allergies.

10. Social history negative.

11. MAR was noted.

12. Case discussed with RN.

13. Family history is noncontributory.

14. Continue treatment per Dr. Greene and consultants.

15. Skin care protocol.

16. Notes and records were noted.

17. Orders were entered and noted.









  ______________________________________________

  Ptatie Boyer M.D.





DR:  KRISTOFER

D:  10/29/2017 11:27

T:  10/29/2017 20:40

JOB#:  [id number]

CC:

## 2017-10-30 VITALS — SYSTOLIC BLOOD PRESSURE: 125 MMHG | DIASTOLIC BLOOD PRESSURE: 65 MMHG

## 2017-10-30 VITALS — SYSTOLIC BLOOD PRESSURE: 136 MMHG | DIASTOLIC BLOOD PRESSURE: 58 MMHG

## 2017-10-30 VITALS — DIASTOLIC BLOOD PRESSURE: 64 MMHG | SYSTOLIC BLOOD PRESSURE: 133 MMHG

## 2017-10-30 VITALS — DIASTOLIC BLOOD PRESSURE: 63 MMHG | SYSTOLIC BLOOD PRESSURE: 119 MMHG

## 2017-10-30 VITALS — SYSTOLIC BLOOD PRESSURE: 138 MMHG | DIASTOLIC BLOOD PRESSURE: 69 MMHG

## 2017-10-30 VITALS — DIASTOLIC BLOOD PRESSURE: 64 MMHG | SYSTOLIC BLOOD PRESSURE: 129 MMHG

## 2017-10-30 LAB
ADD MANUAL DIFF: NO
ANION GAP SERPL CALC-SCNC: 4 MMOL/L (ref 5–15)
BASOPHILS NFR BLD AUTO: 1.1 % (ref 0–2)
BUN SERPL-MCNC: 8 MG/DL (ref 7–18)
CALCIUM SERPL-MCNC: 8.8 MG/DL (ref 8.5–10.1)
CHLORIDE SERPL-SCNC: 103 MMOL/L (ref 98–107)
CO2 SERPL-SCNC: 29 MMOL/L (ref 21–32)
CREAT SERPL-MCNC: 1.2 MG/DL (ref 0.55–1.3)
EOSINOPHIL NFR BLD AUTO: 13.8 % (ref 0–3)
ERYTHROCYTE [DISTWIDTH] IN BLOOD BY AUTOMATED COUNT: 14.4 % (ref 11.6–14.8)
HCT VFR BLD CALC: 31.8 % (ref 42–52)
HGB BLD-MCNC: 10.3 G/DL (ref 14.2–18)
LYMPHOCYTES NFR BLD AUTO: 23.5 % (ref 20–45)
MCV RBC AUTO: 92 FL (ref 80–99)
MONOCYTES NFR BLD AUTO: 8.1 % (ref 1–10)
NEUTROPHILS NFR BLD AUTO: 53.4 % (ref 45–75)
PLATELET # BLD: 192 K/UL (ref 150–450)
POTASSIUM SERPL-SCNC: 3.9 MMOL/L (ref 3.5–5.1)
RBC # BLD AUTO: 3.44 M/UL (ref 4.7–6.1)
SODIUM SERPL-SCNC: 136 MMOL/L (ref 136–145)
WBC # BLD AUTO: 4.7 K/UL (ref 4.8–10.8)

## 2017-10-30 RX ADMIN — SODIUM CHLORIDE SCH MLS/HR: 0.9 INJECTION INTRAVENOUS at 11:21

## 2017-10-30 RX ADMIN — HEPARIN SODIUM SCH UNITS: 5000 INJECTION INTRAVENOUS; SUBCUTANEOUS at 11:17

## 2017-10-30 RX ADMIN — DIVALPROEX SODIUM SCH MG: 125 CAPSULE ORAL at 20:42

## 2017-10-30 RX ADMIN — ASPIRIN SCH MG: 81 TABLET, DELAYED RELEASE ORAL at 11:14

## 2017-10-30 RX ADMIN — DONEPEZIL HYDROCHLORIDE SCH MG: 10 TABLET, FILM COATED ORAL at 11:13

## 2017-10-30 RX ADMIN — HEPARIN SODIUM SCH UNITS: 5000 INJECTION INTRAVENOUS; SUBCUTANEOUS at 20:36

## 2017-10-30 RX ADMIN — DOCUSATE SODIUM SCH MG: 100 CAPSULE, LIQUID FILLED ORAL at 11:13

## 2017-10-30 RX ADMIN — MEMANTINE HYDROCHLORIDE SCH MG: 10 TABLET ORAL at 11:14

## 2017-10-30 RX ADMIN — DOCUSATE SODIUM SCH MG: 100 CAPSULE, LIQUID FILLED ORAL at 17:58

## 2017-10-30 RX ADMIN — ECONAZOLE NITRATE SCH APPLIC: 10 CREAM TOPICAL at 11:19

## 2017-10-30 NOTE — DIAGNOSTIC IMAGING REPORT
--------------- APPROVED REPORT --------------





CPT Code: 44471



Symptoms

Claudication :  Bilaterally 





BILATERAL: Common femoral artery waveform analysis is within normal limits at rest. Color 

flow duplex sonography reveals patency of the superficial femoral, popliteal, and tibial 

arteries, there is no evidence of stenosis or occlusion within these segments. Doppler 

tibial artery waveform analysis is within normal limits, bilaterally. There is no 

evidence of significant arterial occlusive disease, bilaterally.

## 2017-10-30 NOTE — DIAGNOSTIC IMAGING REPORT
--------------- APPROVED REPORT --------------





CPT Code: 78360



Symptoms

Claudication :  Bilaterally 





BILATERAL: Common femoral artery waveform analysis is within normal limits at rest. Color 

flow duplex sonography reveals patency of the superficial femoral, popliteal, and tibial 

arteries, there is no evidence of stenosis or occlusion within these segments. Doppler 

tibial artery waveform analysis is within normal limits, bilaterally. There is no 

evidence of significant arterial occlusive disease, bilaterally.

## 2017-10-30 NOTE — CONSULTATION
Consult Note


Assessment/Plan


A/


1) Cellulitis RLE


2) PVD


3) Edema


4) Skin dystrophy





P/


1) No open wounds noted. No surgical intervention


2) Seen by [facility] surgery. Recs appreciated


3) Compression contraindicated at this time


4) Will prescribe Econazole cream daily to RLE to help with skin dystrophy 

possible tinea infection








Thank you Dr Greene.











Dmitriy Wray DPM Oct 30, 2017 08:15

## 2017-10-30 NOTE — CONSULTATION
DATE OF CONSULTATION:  10/30/2017



REQUESTING PHYSICIAN:  Dat Greene M.D.



CONSULTING PHYSICIAN:  Dmitriy Bernstein D.P.M.



REASON FOR CONSULTATION:  Recurrent cellulitis, right lower extremity.



HISTORY OF PRESENT ILLNESS:  The patient is an 85-year-old male who was

ad[place] [facility] [place] for sepsis.  The patient

has a history of recurrent cellulitis of the lower extremities with

[facility].  The patient has dementia and has difficulty hearing.

Responses are difficult to ascertain.



PAST MEDICAL HISTORY:  Significant for organic brain syndrome, hypertensive

cardiovascular disease, deafness, hyperlipidemia, Alzheimer's dementia,

dyslipidemia, and peripheral vascular disease.



ALLERGIES:  He has no known drug allergies.



MEDICATIONS:  Per MAR and include morphine, cefepime, vancomycin, heparin

for DVT prophylaxis, 81 mg of aspirin, and Plavix 75 mg daily.



FAMILY HISTORY:  Unremarkable.



SOCIAL HISTORY:  The patient resides in a skilled nursing facility.



REVIEW OF SYSTEMS:  Difficult to obtain secondary to mental status.



PHYSICAL EXAMINATION:

VITAL SIGNS:  Temperature is 97.7, pulse is 72, respirations 18, blood

pressure is 129/64, and saturating 99% on room air.

EXTREMITIES:  Lower extremity physical exam, vascular, nonpalpable pedal

pulses noted bilaterally.  Feet are equally warm.  There is pitting edema

noted on the right lower extremity, none noted on the left.  No cyanosis

is noted.

DERMATOLOGICAL:  There are no open sores or lesions noted bilaterally.

Interdigital spaces are clear bilaterally.  There is dystrophic change of

the skin noted on the right lower extremity.  Again, no open sores or

lesions are noted.  Some discoloration is noted as well.  No malodor is

noted.

MUSCULOSKELETAL:  No gross deformities are noted.

NEUROLOGICAL:  The patient's gross neurological exam is unremarkable.



IMAGING DATA:  No lower extremity imaging is noted on this admission.



LABORATORY DATA:  White blood cell count is 11.3, hemoglobin and hematocrit

is 15.2 and 43.3, and platelet count is 319.  Potassium is 4.1, BUN is

_____, creatinine is 1.8.  Lactic acid is 1.70.  Albumin is 2.5. INR is

1.0.



ASSESSMENT:

1. Cellulitis, right lower extremity.

2. Peripheral vascular disease.

3. Edema.

4. Skin dystrophy.



PLAN:

1. No open wounds noted.  No surgical intervention is indicated at this

time.

2. The patient has been seen by Vascular Surgery.  Recommendations are

appreciated.

3. Compression therapy at this point is contraindicated at this time.

4. We will prescribe a _____ Conazol cream daily to the right lower

extremity to help with skin dystrophy and possible tinea infection.



Thank you for the courtesy of this consultation.









  ______________________________________________

  Dmitriy Bernstein D.P.M.





DR:  Jens

D:  10/30/2017 08:20

T:  10/30/2017 17:35

JOB#:  [id number]

CC:  Dat Greene M.D.; Fax#:  [phone]

DMITRIY BERNSTEIN D.P.M.  ; FAX#:  [phone]

## 2017-10-30 NOTE — CONSULTATION
DATE OF CONSULTATION:  10/30/2017



REQUESTING PHYSICIAN:  Dat Greene M.D.



CONSULTING PHYSICIAN:  Dmitriy Bernstein D.P.M.



REASON FOR CONSULTATION:  Recurrent cellulitis, right lower extremity.



HISTORY OF PRESENT ILLNESS:  The patient is an 85-year-old male who was

admitted to [facility] on 10/28/2017 for sepsis.  The patient

has a history of recurrent cellulitis of the lower extremities with

[facility].  The patient has dementia and has difficulty hearing.

Responses are difficult to ascertain.



PAST MEDICAL HISTORY:  Significant for organic brain syndrome, hypertensive

cardiovascular disease, deafness, hyperlipidemia, Alzheimer's dementia,

dyslipidemia, and peripheral vascular disease.



ALLERGIES:  He has no known drug allergies.



MEDICATIONS:  Per MAR and include morphine, cefepime, vancomycin, heparin

for DVT prophylaxis, 81 mg of aspirin, and Plavix 75 mg daily.



FAMILY HISTORY:  Unremarkable.



SOCIAL HISTORY:  The patient resides in a skilled nursing facility.



REVIEW OF SYSTEMS:  Difficult to obtain secondary to mental status.



PHYSICAL EXAMINATION:

VITAL SIGNS:  Temperature is 97.7, pulse is 72, respirations 18, blood

pressure is 129/64, and saturating 99% on room air.

EXTREMITIES:  Lower extremity physical exam, vascular, nonpalpable pedal

pulses noted bilaterally.  Feet are equally warm.  There is pitting edema

noted on the right lower extremity, none noted on the left.  No cyanosis

is noted.

DERMATOLOGICAL:  There are no open sores or lesions noted bilaterally.

Interdigital spaces are clear bilaterally.  There is dystrophic change of

the skin noted on the right lower extremity.  Again, no open sores or

lesions are noted.  Some discoloration is noted as well.  No malodor is

noted.

MUSCULOSKELETAL:  No gross deformities are noted.

NEUROLOGICAL:  The patient's gross neurological exam is unremarkable.



IMAGING DATA:  No lower extremity imaging is noted on this admission.



LABORATORY DATA:  White blood cell count is 11.3, hemoglobin and hematocrit

is 15.2 and 43.3, and platelet count is 319.  Potassium is 4.1, BUN is

_____, creatinine is 1.8.  Lactic acid is 1.70.  Albumin is 2.5. INR is

1.0.



ASSESSMENT:

1. Cellulitis, right lower extremity.

2. Peripheral vascular disease.

3. Edema.

4. Skin dystrophy.



PLAN:

1. No open wounds noted.  No surgical intervention is indicated at this

time.

2. The patient has been seen by Vascular Surgery.  Recommendations are

appreciated.

3. Compression therapy at this point is contraindicated at this time.

4. We will prescribe a _____ Conazol cream daily to the right lower

extremity to help with skin dystrophy and possible tinea infection.



Thank you for the courtesy of this consultation.









  ______________________________________________

  Dmitriy Bernstein D.P.M.





DR:  Jens

D:  10/30/2017 08:20

T:  10/30/2017 17:35

JOB#:  [id number]

CC:  Dat Greene M.D.; Fax#:  [phone]

DMITRIY BERNSTEIN D.P.M.  ; FAX#:  [phone]

## 2017-10-30 NOTE — CONSULTATION
Consult Note


Assessment/Plan


A/


1) Cellulitis RLE


2) PVD


3) Edema


4) Skin dystrophy





P/


1) No open wounds noted. No surgical intervention


2) Seen by [facility] surgery. Recs appreciated


3) Compression contraindicated at this time


4) Will prescribe Econazole cream daily to RLE to help with skin dystrophy 

possible tinea infection








Thank you Dr Greene.











Dmitriy Wray DPM Oct 30, 2017 08:15

## 2017-10-30 NOTE — DIAGNOSTIC IMAGING REPORT
--------------- APPROVED REPORT --------------





CPT Code: 53919



Present Symptoms

Lower Extremity Pain:  Bilateral 





BILATERAL: Imaging reveals a patent deep venous system bilaterally. There is no evidence 

of thrombus within the femoral, popliteal or tibial segments. The greater saphenous veins 

are also within normal limits. Doppler indicates normal spontaneous flow within these 

segments.

## 2017-10-30 NOTE — CONSULTATION
Consult Note


Assessment/Plan


A/


1) Cellulitis RLE


2) PVD


3) Edema


4) Skin dystrophy





P/


1) No open wounds noted. No surgical intervention


2) Seen by [facility] surgery. Recs appreciated


3) Compression contraindicated at this time


4) Will prescribe Econazole cream daily to RLE to help with skin dystrophy 

possible tinea infection








Thank you Dr Greene.











Dmitriy Wray DPM Oct 30, 2017 08:15

## 2017-10-30 NOTE — DIAGNOSTIC IMAGING REPORT
--------------- APPROVED REPORT --------------





CPT Code: 06852



Present Symptoms

Lower Extremity Pain:  Bilateral 





BILATERAL: Imaging reveals a patent deep venous system bilaterally. There is no evidence 

of thrombus within the femoral, popliteal or tibial segments. The greater saphenous veins 

are also within normal limits. Doppler indicates normal spontaneous flow within these 

segments.

## 2017-10-30 NOTE — GENERAL PROGRESS NOTE
Assessment/Plan


Assessment/Plan


B LE cellulitis. This is the source of infection.


On IV Abx.


Seen by Vasc Sx, ID and Podiatry.


Stable for DC to SNF.





Subjective


Allergies:  


Coded Allergies:  


     No Known Allergies (Unverified , 10/27/17)


Subjective


Confused





Objective





Last 24 Hour Vital Signs








  Date Time  Temp Pulse Resp B/P (MAP) Pulse Ox O2 Delivery O2 Flow Rate FiO2


 


10/30/17 11:52 97.8 71 20 136/58 96 Room Air  


 


10/30/17 08:17 98.2 65 20 119/63 96 Room Air  


 


10/30/17 04:00 97.7 72 18 129/64 99 Room Air  


 


10/30/17 00:00 97.6 83 18 138/69 100 Room Air  


 


10/29/17 20:00 97.5 83 18 145/71 100 Room Air  


 


10/29/17 16:00 97.5 84 18 134/64 100 Room Air  

















Intake and Output  


 


 10/30/17 10/31/17





 19:00 07:00


 


  


 


# Voids 1 








Laboratory Tests


10/30/17 10:50: 


Sodium Level 136, Potassium Level 3.9, Chloride Level 103, Carbon Dioxide Level 

29, Anion Gap 4L, Blood Urea Nitrogen 8, Creatinine 1.2, Estimat Glomerular 

Filtration Rate , Glucose Level 86, Calcium Level 8.8


10/30/17 11:00: 


White Blood Count 4.7L, Red Blood Count 3.44L, Hemoglobin 10.3L, Hematocrit 

31.8L, Mean Corpuscular Volume 92, Mean Corpuscular Hemoglobin 30.0, Mean 

Corpuscular Hemoglobin Concent 32.4, Red Cell Distribution Width 14.4, Platelet 

Count 192, Mean Platelet Volume 7.7, Neutrophils (%) (Auto) 53.4, Lymphocytes (%

) (Auto) 23.5, Monocytes (%) (Auto) 8.1, Eosinophils (%) (Auto) 13.8H, 

Basophils (%) (Auto) 1.1, Random Vancomycin Level 4.8


Height (Feet):  5


Height (Inches):  9.00


Weight (Pounds):  152


Objective


CV RR


Lungs CTA


Abd SNT. Bs +


E B Edema ++ cellulitis











MIRIAN PEDRO Oct 30, 2017 13:00

## 2017-10-30 NOTE — CONSULTATION
DATE OF CONSULTATION:  10/30/2017



REQUESTING PHYSICIAN:  Dat Greene M.D.



CONSULTING PHYSICIAN:  Dmitriy Bernstein D.P.M.



REASON FOR CONSULTATION:  Recurrent cellulitis, right lower extremity.



HISTORY OF PRESENT ILLNESS:  The patient is an 85-year-old male who was

admitted to [facility] on 10/28/2017 for sepsis.  The patient

has a history of recurrent cellulitis of the lower extremities with

[facility].  The patient has dementia and has difficulty hearing.

Responses are difficult to ascertain.



PAST MEDICAL HISTORY:  Significant for organic brain syndrome, hypertensive

cardiovascular disease, deafness, hyperlipidemia, Alzheimer's dementia,

dyslipidemia, and peripheral vascular disease.



ALLERGIES:  He has no known drug allergies.



MEDICATIONS:  Per MAR and include morphine, cefepime, vancomycin, heparin

for DVT prophylaxis, 81 mg of aspirin, and Plavix 75 mg daily.



FAMILY HISTORY:  Unremarkable.



SOCIAL HISTORY:  The patient resides in a skilled nursing facility.



REVIEW OF SYSTEMS:  Difficult to obtain secondary to mental status.



PHYSICAL EXAMINATION:

VITAL SIGNS:  Temperature is 97.7, pulse is 72, respirations 18, blood

pressure is 129/64, and saturating 99% on room air.

EXTREMITIES:  Lower extremity physical exam, vascular, nonpalpable pedal

pulses noted bilaterally.  Feet are equally warm.  There is pitting edema

noted on the right lower extremity, none noted on the left.  No cyanosis

is noted.

DERMATOLOGICAL:  There are no open sores or lesions noted bilaterally.

Interdigital spaces are clear bilaterally.  There is dystrophic change of

the skin noted on the right lower extremity.  Again, no open sores or

lesions are noted.  Some discoloration is noted as well.  No malodor is

noted.

MUSCULOSKELETAL:  No gross deformities are noted.

NEUROLOGICAL:  The patient's gross neurological exam is unremarkable.



IMAGING DATA:  No lower extremity imaging is noted on this admission.



LABORATORY DATA:  White blood cell count is 11.3, hemoglobin and hematocrit

is 15.2 and 43.3, and platelet count is 319.  Potassium is 4.1, BUN is

_____, creatinine is 1.8.  Lactic acid is 1.70.  Albumin is 2.5. INR is

1.0.



ASSESSMENT:

1. Cellulitis, right lower extremity.

2. Peripheral vascular disease.

3. Edema.

4. Skin dystrophy.



PLAN:

1. No open wounds noted.  No surgical intervention is indicated at this

time.

2. The patient has been seen by Vascular Surgery.  Recommendations are

appreciated.

3. Compression therapy at this point is contraindicated at this time.

4. We will prescribe a _____ Conazol cream daily to the right lower

extremity to help with skin dystrophy and possible tinea infection.



Thank you for the courtesy of this consultation.









  ______________________________________________

  Dmitriy Bernstein D.P.M.





DR:  Jens

D:  10/30/2017 08:20

T:  10/30/2017 17:35

JOB#:  [id number]

CC:  Dat Greene M.D.; Fax#:  [phone]

DMITRIY BERNSTEIN D.P.M.  ; FAX#:  [phone]

## 2017-10-30 NOTE — DIAGNOSTIC IMAGING REPORT
--------------- APPROVED REPORT --------------





CPT Code: 73438



Present Symptoms

Lower Extremity Pain:  Bilateral 





BILATERAL: Imaging reveals a patent deep venous system bilaterally. There is no evidence 

of thrombus within the femoral, popliteal or tibial segments. The greater saphenous veins 

are also within normal limits. Doppler indicates normal spontaneous flow within these 

segments.

## 2017-10-30 NOTE — DIAGNOSTIC IMAGING REPORT
Indication: ABD DIST



Technique: IV administration 5.4 mCi 99M technetium mebrofenin. Serial images

obtained over the abdomen



Comparison: Reference made to CT scan 10/27/2017



Findings: Prompt hepatic tracer uptake. Extrahepatic bile ducts are visible at 

10

minutes. Tracer begins superior the gallbladder at 19 minutes. Tracer seen in 

the

duodenum at 28 minutes



Impression: Negative. No evidence of cystic duct or common bile duct obstruction

## 2017-10-31 VITALS — DIASTOLIC BLOOD PRESSURE: 69 MMHG | SYSTOLIC BLOOD PRESSURE: 127 MMHG

## 2017-10-31 VITALS — DIASTOLIC BLOOD PRESSURE: 70 MMHG | SYSTOLIC BLOOD PRESSURE: 120 MMHG

## 2017-10-31 VITALS — DIASTOLIC BLOOD PRESSURE: 75 MMHG | SYSTOLIC BLOOD PRESSURE: 136 MMHG

## 2017-10-31 RX ADMIN — DOCUSATE SODIUM SCH MG: 100 CAPSULE, LIQUID FILLED ORAL at 09:42

## 2017-10-31 RX ADMIN — DIVALPROEX SODIUM SCH MG: 125 CAPSULE ORAL at 09:37

## 2017-10-31 RX ADMIN — DONEPEZIL HYDROCHLORIDE SCH MG: 10 TABLET, FILM COATED ORAL at 09:31

## 2017-10-31 RX ADMIN — ECONAZOLE NITRATE SCH APPLIC: 10 CREAM TOPICAL at 09:44

## 2017-10-31 RX ADMIN — ASPIRIN SCH MG: 81 TABLET, DELAYED RELEASE ORAL at 09:27

## 2017-10-31 RX ADMIN — MEMANTINE HYDROCHLORIDE SCH MG: 10 TABLET ORAL at 09:29

## 2017-10-31 RX ADMIN — HEPARIN SODIUM SCH UNITS: 5000 INJECTION INTRAVENOUS; SUBCUTANEOUS at 09:45

## 2017-10-31 RX ADMIN — SODIUM CHLORIDE SCH MLS/HR: 0.9 INJECTION INTRAVENOUS at 10:35

## 2017-10-31 NOTE — INFECTIOUS DISEASES PROG NOTE
Assessment/Plan


Assessment/Plan





ASSESSMENT AND PLAN:





1. bilateral leg cellulitis, leg edema, arteriovenous insufficiency, sepsis, 

fevers, leukocytosis





   - clinically better, less cellulitis, sepsis better 


   - continue vancomycin and cefepime for 7 days more


   - d/w vascular surgery and we agree with iv abx because of underlying leg 

pathology


   - d/w Dr. Greene and RN





2. Elevated creatinine - improved


3. Hemoglobin stable.


4. Deafness.


5. Organic brain syndrome, hx psychosis 


6. Hypertension.


7. Hypertensive heart disease.


8. Hyperlipidemia.


9. No allergies.


10. Social history negative.


11. MAR was noted.


12. Case discussed with RN.


13. Family history is noncontributory.


14. Continue treatment per Dr. Greene and consultants.


15. Skin care protocol.


16. Notes and records were noted.


17. Orders were entered and noted.





Subjective


Constitutional:  Reports: fatigue, Denies: fever


HEENT:  Denies: dysphagia, congestion


Respiratory:  Denies: shortness of breath


Cardiovascular:  Denies: chest pain


Gastrointestinal/Abdominal:  Denies: nausea, vomiting, diarrhea


Genitourinary:  Denies: dysuria, hematuria, frequency


Neurologic:  Denies: headache, numbness


Psychiatric:  Denies: depression


Skin:  Denies: rash


Hematologic:  Denies: bleeding


Musculoskeletal:  Denies: pain


Allergies:  


Coded Allergies:  


     No Known Allergies (Unverified , 10/27/17)





Objective


Vital Signs





Last 24 Hour Vital Signs








  Date Time  Temp Pulse Resp B/P (MAP) Pulse Ox O2 Delivery O2 Flow Rate FiO2


 


10/31/17 08:00 96.4 83 17 120/70 100 Room Air  


 


10/31/17 04:53 96.7 82 18 127/69 100 Room Air  


 


10/31/17 00:13 96.8 86 20 136/75 100 Room Air  


 


10/30/17 20:16 96.5 69 20 133/64 100 Room Air  


 


10/30/17 16:03 98.0 69 20 125/65 96 Room Air  


 


10/30/17 11:52 97.8 71 20 136/58 96 Room Air  








Height (Feet):  5


Height (Inches):  9.00


Weight (Pounds):  152


General Appearance:  no acute distress


HEENT:  normocephalic, atraumatic, anicteric, mucous membranes moist, EOMI, 

pharynx normal, supple, no JVD


Respiratory/Chest:  lungs clear, normal breath sounds, no respiratory distress, 

no accessory muscle use


Cardiovascular:  normal rate, regular rhythm, no gallop/murmur, no JVD


Abdomen:  normal bowel sounds, soft, non tender, no organomegaly, non distended


Genitourinary:  other - no ibarra


Extremities:  no cyanosis, other - + edema, less warmth and redness, r>l leg 

cellulitis


Skin:  no rash, other


Neurologic/Psychiatric:  CNs II-XII grossly normal, abnormal gait, responsive


Lymphatic:  no neck adenopathy


Musculoskeletal:  no effusion, other - no septic arthritis


Objective








hida - negative (report noted)





ct - abdomen and pelvis - nad (report noted)





chest x-ray - nad (report noted)





Microbiology








 Date/Time


Source Procedure


Growth Status


 


 


 10/27/17 23:45


Blood Blood Culture - Preliminary


NO GROWTH AFTER 72 HOURS Resulted











Laboratory Tests








Test


  10/30/17


10:50 10/30/17


11:00


 


Sodium Level


  136 MMOL/L


(136-145) 


 


 


Potassium Level


  3.9 MMOL/L


(3.5-5.1) 


 


 


Chloride Level


  103 MMOL/L


() 


 


 


Carbon Dioxide Level


  29 MMOL/L


(21-32) 


 


 


Anion Gap


  4 mmol/L


(5-15)  L 


 


 


Blood Urea Nitrogen


  8 mg/dL (7-18)


  


 


 


Creatinine


  1.2 MG/DL


(0.55-1.30) 


 


 


Estimat Glomerular Filtration


Rate  mL/min (>60)  


  


 


 


Glucose Level


  86 MG/DL


() 


 


 


Calcium Level


  8.8 MG/DL


(8.5-10.1) 


 


 


White Blood Count


  


  4.7 K/UL


(4.8-10.8)  L


 


Red Blood Count


  


  3.44 M/UL


(4.70-6.10)  L


 


Hemoglobin


  


  10.3 G/DL


(14.2-18.0)  L


 


Hematocrit


  


  31.8 %


(42.0-52.0)  L


 


Mean Corpuscular Volume  92 FL (80-99)  


 


Mean Corpuscular Hemoglobin


  


  30.0 PG


(27.0-31.0)


 


Mean Corpuscular Hemoglobin


Concent 


  32.4 G/DL


(32.0-36.0)


 


Red Cell Distribution Width


  


  14.4 %


(11.6-14.8)


 


Platelet Count


  


  192 K/UL


(150-450)


 


Mean Platelet Volume


  


  7.7 FL


(6.5-10.1)


 


Neutrophils (%) (Auto)


  


  53.4 %


(45.0-75.0)


 


Lymphocytes (%) (Auto)


  


  23.5 %


(20.0-45.0)


 


Monocytes (%) (Auto)


  


  8.1 %


(1.0-10.0)


 


Eosinophils (%) (Auto)


  


  13.8 %


(0.0-3.0)  H


 


Basophils (%) (Auto)


  


  1.1 %


(0.0-2.0)


 


Random Vancomycin Level  4.8 ug/mL  











Current Medications








 Medications


  (Trade)  Dose


 Ordered  Sig/Nimesh


 Route


 PRN Reason  Start Time


 Stop Time Status Last Admin


Dose Admin


 


 Acetaminophen


  (Tylenol)  500 mg  Q4H  PRN


 ORAL


 Mild Pain (Pain Scale 1-3)  10/28/17 08:00


 11/27/17 07:59   


 


 


 Acetaminophen


  (Tylenol)  650 mg  Q4H  PRN


 ORAL


 Moderate Pain/Temp > 100.5  10/28/17 08:00


 11/27/17 07:59   


 


 


 Al Hydroxide/Mg


 Hydroxide


  (Mylanta II)  30 ml  Q6H  PRN


 ORAL


 DYSPEPSIA/GI DISTRESS  10/28/17 09:00


 11/27/17 08:59   


 


 


 Aspirin


  (Ecotrin)  81 mg  DAILY


 ORAL


   10/28/17 09:00


 11/27/17 08:59  10/30/17 11:14


 


 


 Cefepime HCl 2 gm/


 Dextrose  110 ml @ 


 220 mls/hr  Q24H


 IVPB


   10/29/17 10:30


 11/5/17 10:29  10/30/17 11:21


 


 


 Clopidogrel


 Bisulfate


  (Plavix)  75 mg  DAILY


 ORAL


   10/28/17 09:00


 11/27/17 08:59  10/30/17 11:13


 


 


 Divalproex Sodium


  (Depakote


 Sprinkles)  250 mg  Q12HR


 ORAL


   10/30/17 21:00


 11/29/17 20:59  10/30/17 20:42


 


 


 Docusate Sodium


  (Colace)  100 mg  TWICE A  DAY


 ORAL


   10/28/17 09:00


 11/27/17 08:59  10/30/17 17:58


 


 


 Donepezil HCl


  (Aricept)  10 mg  DAILY


 ORAL


   10/28/17 09:00


 11/27/17 08:59  10/30/17 11:13


 


 


 Econazole Nitrate


  (Spectazole)  1 applic  DAILY


 TOPIC


   10/30/17 09:00


 11/29/17 08:59  10/30/17 11:19


 


 


 Heparin Sodium


  (Porcine)


  (Heparin 5000


 units/ml)  5,000 units  EVERY 12  HOURS


 SUBQ


   10/28/17 09:00


 11/27/17 08:59  10/29/17 20:59


 


 


 Lorazepam


  (Ativan)  1 mg  Q6H  PRN


 ORAL


 For Anxiety  10/28/17 07:45


 11/4/17 07:44  10/30/17 08:35


 


 


 Magnesium


 Hydroxide


  (Mom)  30 ml  Q4H  PRN


 ORAL


 CONSTIPATION  10/28/17 08:00


 11/27/17 07:59   


 


 


 Memantine


  (Namenda)  10 mg  DAILY


 ORAL


   10/28/17 09:00


 11/27/17 08:59  10/30/17 11:14


 


 


 Multivitamins


  (Multivitamins)  1 tab  DAILY


 ORAL


   10/28/17 09:00


 11/27/17 08:59  10/30/17 11:14


 


 


 Pantoprazole


  (Protonix)  40 mg  DAILY


 ORAL


   10/28/17 09:00


 11/27/17 08:59  10/30/17 11:13


 


 


 Pravastatin Sodium


  (Pravachol)  10 mg  BEDTIME


 ORAL


   10/28/17 21:00


 11/27/17 20:59  10/30/17 20:41


 


 


 Vancomycin HCl


  (Vanco rx to


 dose)  1 ea  DAILY  PRN


 MISC


 Per rx protocol  10/29/17 09:15


 11/28/17 09:14   


 


 


 Vancomycin/Sodium


 Chloride  250 ml @ 


 166.667


 mls/hr  Q24H


 IVPB


   10/31/17 13:00


 11/5/17 12:59   


 

















ARIAN FABIAN Oct 31, 2017 09:19

## 2017-10-31 NOTE — VASCULAR SURGERY PROGRESS NOTE
Subjective


Subjective


All noted


No new complaints


Better on abx per ID


Confused baseline





Objective


Objective





Last 24 Hour Vital Signs








  Date Time  Temp Pulse Resp B/P (MAP) Pulse Ox O2 Delivery O2 Flow Rate FiO2


 


10/31/17 08:00 96.4 83 17 120/70 100 Room Air  


 


10/31/17 04:53 96.7 82 18 127/69 100 Room Air  


 


10/31/17 00:13 96.8 86 20 136/75 100 Room Air  


 


10/30/17 20:16 96.5 69 20 133/64 100 Room Air  


 


10/30/17 16:03 98.0 69 20 125/65 96 Room Air  

















Intake and Output  


 


 10/31/17 11/1/17





 19:00 07:00


 


Intake Total 150 ml 


 


Balance 150 ml 


 


  


 


Intake Oral 150 ml 


 


# Voids 1 








Height (Feet):  5


Height (Inches):  9.00


Weight (Pounds):  152


Objective


cvs rrr


lungs cta


abd soft nontender


2+ femorals intact popliteals 3+ left DP palpable pulses


Left leg c/d/i no ulcers


Right leg mixed venous stasis changes absent right foot pulses





Assessment/Plan


Assessment





IMPRESSION:


1. Right lower extremity cellulitis with history of mixed arteriovenous


insufficiency with leukocytosis and low-grade fever----- improving on abx per ID


2. History of dementia.


3. Psychosis.


4. Organic brain syndrome.


5. History of hypertension and renal failure.


6. Prior history of left leg successful percutaneous revascularization


with resolution of symptoms--- has palpable left DP pulses with no left leg 

ulcers


Plan


RECOMMENDATION:


1. Antibiotics per Infectious Disease service.


2. Continue DVT and decubitus precautions.


3. The patient will require contralateral right leg revascularization and 

angiography as outpatient-- will be scheduled & coordinated with PMD and family





d/w pt's nurse


d/w pmd and CECE ROSAS Oct 31, 2017 14:00

## 2017-10-31 NOTE — CONSULTATION
History of Present Illness


General


Date patient seen:  Oct 30, 2017


Chief Complaint:  Fever





Present Illness


HPI


85[facility]  The patient developed a fever 

in the nursing home.


Allergies:  


Coded Allergies:  


     No Known Allergies (Unverified , 10/27/17)





Medication History


Scheduled


Aspirin* (Aspir 81*), 81 MG ORAL DAILY, (Reported)


Clopidogrel* (Clopidogrel*), 75 MG ORAL DAILY, (Reported)


Divalproex Sodium* (Depakote*), 250 MG PO Q12HR, (Reported)


Docusate Sodium* (Docusate Sodium*), 100 MG ORAL TWICE A DAY, (Reported)


Donepezil Hcl* (Donepezil Hcl*), 10 MG ORAL DAILY, (Reported)


Heparin Sod (Porcine) (Heparin Sodium*), 5,000 UNITS SUBQ EVERY 12 HOURS, (

Reported)


Lorazepam (Lorazepam), 0.5 GM IV Q6HR, (Reported)


Mag Hydrox/Al Hydrox/Simeth (Alum-Mag Hydroxide-Simeth Liq), 360 ML PO FOUR 

TIMES A DAY, (Reported)


Magnesium Hydroxide* (Milk Of Magnesia*), 30 ML ORAL HS, (Reported)


Memantine Hcl* (Namenda*), 10 MG ORAL DAILY, (Reported)


Multivitamins* (Multivitamins*), 1 TAB ORAL DAILY, (Reported)


Pantoprazole* (Pantoprazole*), 40 MG ORAL DAILY, (Reported)


Piperacillin-Tazo-Dextrose,Iso (Zosyn 3.375 Gm Pre Mix-Bag), 3.375 GM IVPB 

EVERY 8 HOURS, (Reported)


Pravastatin Sod* (Pravastatin Sod*), 10 MG ORAL BEDTIME, (Reported)


Vancomycin Hcl/D5w (Vancomycin-D5w 1 G/250 Ml), 1 GM IVPB Q24H, (Reported)





Scheduled PRN


Acetaminophen* (Tylenol Extra Strength*), 650 MG ORAL Q4HR PRN for Mild Pain/

Temp > 100.5, (Reported)





Discontinued Medications


Acetaminophen* (Tylenol Extra Strength*), 500 MG ORAL EVERY 4 HOURS PRN for 

Moderate Pain (Pain Scale 4-6), (Reported)


   Discontinued Reason: MD discontinued med


Acetaminophen* (Acetaminophen 325MG Tablet*), 650 MG ORAL Q4H PRN for Mild Pain/

Temp > 100.5, (Reported)


   Discontinued Reason: MD discontinued med


Acetylcysteine* (Acetylcysteine*), 600 MG ORAL TWICE A DAY, (Reported)


   Discontinued Reason: MD discontinued med





Patient History


History Provided By:  Patient, Medical Record, PMD


Healthcare decision maker


SISTERZAHRA, [phone]


Resuscitation status


Full Code


Advanced Directive on File


No





Review of Systems


Psychiatric:  Reports: prior hx, anxiety, depressed feelings, emotional problems





Physical Exam


General Appearance:  confused, severe distress, agitated


Neurologic:  alert, responsive, depressed affect





Last 24 Hour Vital Signs








  Date Time  Temp Pulse Resp B/P (MAP) Pulse Ox O2 Delivery O2 Flow Rate FiO2


 


10/31/17 08:00 96.4 83 17 120/70 100 Room Air  


 


10/31/17 04:53 96.7 82 18 127/69 100 Room Air  


 


10/31/17 00:13 96.8 86 20 136/75 100 Room Air  

















Intake and Output  


 


 10/31/17 11/1/17





 19:00 07:00


 


Intake Total 150 ml 


 


Balance 150 ml 


 


  


 


Intake Oral 150 ml 


 


# Voids 1 








Height (Feet):  5


Height (Inches):  9.00


Weight (Pounds):  152





Assessment/Plan


Status:  stable


Assessment/Plan


the pt was hitting staff when they were trying to change him he received a 

cocktail











Suyapa Hinton M.D. Oct 31, 2017 23:10

## 2017-10-31 NOTE — GENERAL PROGRESS NOTE
Assessment/Plan


Assessment/Plan


B LE cellulitis. This is the source of infection.


On IV Abx.


Seen by Vasc Sx, ID and Podiatry.DC held. Going now.


Stable for DC to SNF.





Subjective


Allergies:  


Coded Allergies:  


     No Known Allergies (Unverified , 10/27/17)


Subjective


Confused





Objective





Last 24 Hour Vital Signs








  Date Time  Temp Pulse Resp B/P (MAP) Pulse Ox O2 Delivery O2 Flow Rate FiO2


 


10/31/17 08:00 96.4 83 17 120/70 100 Room Air  


 


10/31/17 04:53 96.7 82 18 127/69 100 Room Air  


 


10/31/17 00:13 96.8 86 20 136/75 100 Room Air  


 


10/30/17 20:16 96.5 69 20 133/64 100 Room Air  


 


10/30/17 16:03 98.0 69 20 125/65 96 Room Air  

















Intake and Output  


 


 10/31/17 11/1/17





 18:59 06:59


 


Intake Total 150 ml 


 


Balance 150 ml 


 


  


 


Intake Oral 150 ml 


 


# Voids 1 








Height (Feet):  5


Height (Inches):  9.00


Weight (Pounds):  152


Objective


CV RR


Lungs CTA


Abd SNT. Bs +


E B Edema ++ cellulitis











MIRIAN PEDRO Oct 31, 2017 12:50

## 2017-10-31 NOTE — CONSULTATION
History of Present Illness


General


Date patient seen:  Oct 30, 2017


Chief Complaint:  Fever





Present Illness


HPI


85[facility]  The patient developed a fever 

in the nursing home.


Allergies:  


Coded Allergies:  


     No Known Allergies (Unverified , 10/27/17)





Medication History


Scheduled


Aspirin* (Aspir 81*), 81 MG ORAL DAILY, (Reported)


Clopidogrel* (Clopidogrel*), 75 MG ORAL DAILY, (Reported)


Divalproex Sodium* (Depakote*), 250 MG PO Q12HR, (Reported)


Docusate Sodium* (Docusate Sodium*), 100 MG ORAL TWICE A DAY, (Reported)


Donepezil Hcl* (Donepezil Hcl*), 10 MG ORAL DAILY, (Reported)


Heparin Sod (Porcine) (Heparin Sodium*), 5,000 UNITS SUBQ EVERY 12 HOURS, (

Reported)


Lorazepam (Lorazepam), 0.5 GM IV Q6HR, (Reported)


Mag Hydrox/Al Hydrox/Simeth (Alum-Mag Hydroxide-Simeth Liq), 360 ML PO FOUR 

TIMES A DAY, (Reported)


Magnesium Hydroxide* (Milk Of Magnesia*), 30 ML ORAL HS, (Reported)


Memantine Hcl* (Namenda*), 10 MG ORAL DAILY, (Reported)


Multivitamins* (Multivitamins*), 1 TAB ORAL DAILY, (Reported)


Pantoprazole* (Pantoprazole*), 40 MG ORAL DAILY, (Reported)


Piperacillin-Tazo-Dextrose,Iso (Zosyn 3.375 Gm Pre Mix-Bag), 3.375 GM IVPB 

EVERY 8 HOURS, (Reported)


Pravastatin Sod* (Pravastatin Sod*), 10 MG ORAL BEDTIME, (Reported)


Vancomycin Hcl/D5w (Vancomycin-D5w 1 G/250 Ml), 1 GM IVPB Q24H, (Reported)





Scheduled PRN


Acetaminophen* (Tylenol Extra Strength*), 650 MG ORAL Q4HR PRN for Mild Pain/

Temp > 100.5, (Reported)





Discontinued Medications


Acetaminophen* (Tylenol Extra Strength*), 500 MG ORAL EVERY 4 HOURS PRN for 

Moderate Pain (Pain Scale 4-6), (Reported)


   Discontinued Reason: MD discontinued med


Acetaminophen* (Acetaminophen 325MG Tablet*), 650 MG ORAL Q4H PRN for Mild Pain/

Temp > 100.5, (Reported)


   Discontinued Reason: MD discontinued med


Acetylcysteine* (Acetylcysteine*), 600 MG ORAL TWICE A DAY, (Reported)


   Discontinued Reason: MD discontinued med





Patient History


History Provided By:  Patient, Medical Record, PMD


Healthcare decision maker


SISTERZAHRA, [phone]


Resuscitation status


Full Code


Advanced Directive on File


No





Review of Systems


Psychiatric:  Reports: prior hx, anxiety, depressed feelings, emotional problems





Physical Exam


General Appearance:  confused, severe distress, agitated


Neurologic:  alert, responsive, depressed affect





Last 24 Hour Vital Signs








  Date Time  Temp Pulse Resp B/P (MAP) Pulse Ox O2 Delivery O2 Flow Rate FiO2


 


10/31/17 08:00 96.4 83 17 120/70 100 Room Air  


 


10/31/17 04:53 96.7 82 18 127/69 100 Room Air  


 


10/31/17 00:13 96.8 86 20 136/75 100 Room Air  

















Intake and Output  


 


 10/31/17 11/1/17





 19:00 07:00


 


Intake Total 150 ml 


 


Balance 150 ml 


 


  


 


Intake Oral 150 ml 


 


# Voids 1 








Height (Feet):  5


Height (Inches):  9.00


Weight (Pounds):  152





Assessment/Plan


Status:  stable


Assessment/Plan


the pt was hitting staff when they were trying to change him he received a 

cocktail











Suyapa Hinton M.D. Oct 31, 2017 23:10

## 2017-10-31 NOTE — CONSULTATION
History of Present Illness


General


Date patient seen:  Oct 30, 2017


Chief Complaint:  Fever





Present Illness


HPI


85[facility]  The patient developed a fever 

in the nursing home.


Allergies:  


Coded Allergies:  


     No Known Allergies (Unverified , 10/27/17)





Medication History


Scheduled


Aspirin* (Aspir 81*), 81 MG ORAL DAILY, (Reported)


Clopidogrel* (Clopidogrel*), 75 MG ORAL DAILY, (Reported)


Divalproex Sodium* (Depakote*), 250 MG PO Q12HR, (Reported)


Docusate Sodium* (Docusate Sodium*), 100 MG ORAL TWICE A DAY, (Reported)


Donepezil Hcl* (Donepezil Hcl*), 10 MG ORAL DAILY, (Reported)


Heparin Sod (Porcine) (Heparin Sodium*), 5,000 UNITS SUBQ EVERY 12 HOURS, (

Reported)


Lorazepam (Lorazepam), 0.5 GM IV Q6HR, (Reported)


Mag Hydrox/Al Hydrox/Simeth (Alum-Mag Hydroxide-Simeth Liq), 360 ML PO FOUR 

TIMES A DAY, (Reported)


Magnesium Hydroxide* (Milk Of Magnesia*), 30 ML ORAL HS, (Reported)


Memantine Hcl* (Namenda*), 10 MG ORAL DAILY, (Reported)


Multivitamins* (Multivitamins*), 1 TAB ORAL DAILY, (Reported)


Pantoprazole* (Pantoprazole*), 40 MG ORAL DAILY, (Reported)


Piperacillin-Tazo-Dextrose,Iso (Zosyn 3.375 Gm Pre Mix-Bag), 3.375 GM IVPB 

EVERY 8 HOURS, (Reported)


Pravastatin Sod* (Pravastatin Sod*), 10 MG ORAL BEDTIME, (Reported)


Vancomycin Hcl/D5w (Vancomycin-D5w 1 G/250 Ml), 1 GM IVPB Q24H, (Reported)





Scheduled PRN


Acetaminophen* (Tylenol Extra Strength*), 650 MG ORAL Q4HR PRN for Mild Pain/

Temp > 100.5, (Reported)





Discontinued Medications


Acetaminophen* (Tylenol Extra Strength*), 500 MG ORAL EVERY 4 HOURS PRN for 

Moderate Pain (Pain Scale 4-6), (Reported)


   Discontinued Reason: MD discontinued med


Acetaminophen* (Acetaminophen 325MG Tablet*), 650 MG ORAL Q4H PRN for Mild Pain/

Temp > 100.5, (Reported)


   Discontinued Reason: MD discontinued med


Acetylcysteine* (Acetylcysteine*), 600 MG ORAL TWICE A DAY, (Reported)


   Discontinued Reason: MD discontinued med





Patient History


History Provided By:  Patient, Medical Record, PMD


Healthcare decision maker


SISTERZAHRA, [phone]


Resuscitation status


Full Code


Advanced Directive on File


No





Review of Systems


Psychiatric:  Reports: prior hx, anxiety, depressed feelings, emotional problems





Physical Exam


General Appearance:  confused, severe distress, agitated


Neurologic:  alert, responsive, depressed affect





Last 24 Hour Vital Signs








  Date Time  Temp Pulse Resp B/P (MAP) Pulse Ox O2 Delivery O2 Flow Rate FiO2


 


10/31/17 08:00 96.4 83 17 120/70 100 Room Air  


 


10/31/17 04:53 96.7 82 18 127/69 100 Room Air  


 


10/31/17 00:13 96.8 86 20 136/75 100 Room Air  

















Intake and Output  


 


 10/31/17 11/1/17





 19:00 07:00


 


Intake Total 150 ml 


 


Balance 150 ml 


 


  


 


Intake Oral 150 ml 


 


# Voids 1 








Height (Feet):  5


Height (Inches):  9.00


Weight (Pounds):  152





Assessment/Plan


Status:  stable


Assessment/Plan


the pt was hitting staff when they were trying to change him he received a 

cocktail











Suyapa Hinton M.D. Oct 31, 2017 23:10

## 2017-11-02 NOTE — DISCHARGE SUMMARY
Discharge Summary


Hospital Course


Date of Admission


Oct 28, 2017 at 01:34


Date of Discharge


Oct 31, 2017 at 13:10


Admitting Diagnosis


sepsis


HPI


Aroldo Lopez is a 85 year old male who was admitted on Oct 28, 2017 at 01:34 for 

Sepsis


Hospital Course


[id number]





Discharge


Discharge Disposition


Patient was discharged to SNF/Subacute Facility(03)


Discharge Diagnoses:  











Micaela Proctor NP Nov 2, 2017 13:13

## 2017-11-02 NOTE — DISCHARGE SUMMARY 2 SIG
DATE OF ADMISSION:  10/28/2017



DATE OF DISCHARGE:  10/31/2017



CONSULTANTS:

1. Joey Murillo M.D.

2. Pattie Boyer M.D.

3. Dmitriy Wray D.P.M.



BRIEF HOSPITAL COURSE:  The patient is an 85-year-old male, admitted from

[facility].  The patient had fever in the nursing home.  He had a

recent hospitalization several weeks ago at [facility] where he had failure to thrive and poor oral intake.  He was noted

to have severe peripheral vascular disease of the left lower extremity.

He underwent angioplasty to his left femoral arterial system with good

results.  He then presented to ED with fever.  Blood work showed WBC of

11.3.  Creatinine was 1.8.  Albumin was 2.5.  Chest x-ray done showed no

acute process.  CT of the abdomen and pelvis showed gallstones.  He had

leukocytosis and fever with elevated heart rate over 90.  He was placed on

vancomycin and cefepime. He was seen by Dr Murillo.  He had warmth 

and redness with swelling on both lower lower extremities, right more than 
left.  

He was seen by Podiatrist. The patient did not have any open wounds.  There was 
no surgical

intervention indicated.  He had palpable femoral pulses, stronger left

popliteal pulse, stronger left foot Doppler signals, and palpable left

dorsalis pedis pulse.  Right foot is warm and has a weaker Doppler.

Venous duplex of lower extremity showed patent deep venous system

bilaterally with no evidence of thrombus.  Arterial scan showed no

evidence of significant arterial occlusive disease bilaterally.  He

underwent HIDA scan.  Results were negative with no evidence of cystic

duct or common bile duct obstruction.  Bilateral lower extremity was

source of infection.  He was eventually discharged back to SNF to continue

antibiotic treatment.



FINAL DIAGNOSES:

1. Bilateral lower extremity cellulitis.

2. Prior left leg percutaneous revascularization.

3. Organic brain syndrome.

4. Dementia.

5. Hypertension.

6. Arteriovenous insufficiency on lower extremities.

7. Sepsis, fever, and leukocytosis.

8. Hypertensive heart disease.

9. Hyperlipidemia.

10. Leg edema with skin dystrophy.



DISPOSITION:  The patient was discharged back to [facility].



DISCHARGE MEDICATIONS:  Refer to medication list.  Continue with Zosyn and

vancomycin.







  ______________________________________________

  Dat Greene M.D.



I have been assigned to dictate discharge summary on this account and I

was not involved in the patient's management.



  ______________________________________________

  Micaela Proctor N.P.





DR:  SILVIA

D:  11/02/2017 13:11

T:  11/02/2017 22:39

JOB#:  [id number]

CC:



LOLITA

## 2017-11-02 NOTE — DISCHARGE SUMMARY
Discharge Summary


Hospital Course


Date of Admission


Oct 28, 2017 at 01:34


Date of Discharge


Oct 31, 2017 at 13:10


Admitting Diagnosis


sepsis


HPI


Aroldo Lopez is a 85 year old male who was admitted on Oct 28, 2017 at 01:34 for 

Sepsis


Hospital Course


[id number]





Discharge


Discharge Disposition


Patient was discharged to [facility](03)


Discharge Diagnoses:  











Micaela Proctor NP Nov 2, 2017 13:13

## 2017-11-02 NOTE — DISCHARGE SUMMARY 2 SIG
DATE OF ADMISSION:  10/28/2017



DATE OF DISCHARGE:  10/31/2017



CONSULTANTS:

1. Joey Murillo M.D.

2. Pattie Boyer M.D.

3. Dmitriy Wray D.P.M.



BRIEF HOSPITAL COURSE:  The patient is an 85-year-old male, admitted from

[facility].  The patient had fever in the nursing home.  He had a

recent hospitalization several weeks ago at [facility] where he had failure to thrive and poor oral intake.  He was noted

to have severe peripheral vascular disease of the left lower extremity.

He underwent angioplasty to his left femoral arterial system with good

results.  He then presented to ED with fever.  Blood work showed WBC of

11.3.  Creatinine was 1.8.  Albumin was 2.5.  Chest x-ray done showed no

acute process.  CT of the abdomen and pelvis showed gallstones.  He had

leukocytosis and fever with elevated heart rate over 90.  He was placed on

vancomycin and cefepime. He was seen by Dr Murillo.  He had warmth 

and redness with swelling on both lower lower extremities, right more than 
left.  

He was seen by Podiatrist. The patient did not have any open wounds.  There was 
no surgical

intervention indicated.  He had palpable femoral pulses, stronger left

popliteal pulse, stronger left foot Doppler signals, and palpable left

dorsalis pedis pulse.  Right foot is warm and has a weaker Doppler.

Venous duplex of lower extremity showed patent deep venous system

bilaterally with no evidence of thrombus.  Arterial scan showed no

evidence of significant arterial occlusive disease bilaterally.  He

underwent HIDA scan.  Results were negative with no evidence of cystic

duct or common bile duct obstruction.  Bilateral lower extremity was

source of infection.  He was eventually discharged back to SNF to continue

antibiotic treatment.



FINAL DIAGNOSES:

1. Bilateral lower extremity cellulitis.

2. Prior left leg percutaneous revascularization.

3. Organic brain syndrome.

4. Dementia.

5. Hypertension.

6. Arteriovenous insufficiency on lower extremities.

7. Sepsis, fever, and leukocytosis.

8. Hypertensive heart disease.

9. Hyperlipidemia.

10. Leg edema with skin dystrophy.



DISPOSITION:  The patient was discharged back to [facility].



DISCHARGE MEDICATIONS:  Refer to medication list.  Continue with Zosyn and

vancomycin.







  ______________________________________________

  Dat Greene M.D.



I have been assigned to dictate discharge summary on this account and I

was not involved in the patient's management.



  ______________________________________________

  Micaela Proctor N.P.





DR:  SILVIA

D:  11/02/2017 13:11

T:  11/02/2017 22:39

JOB#:  [id number]

CC:



LOLITA

## 2017-11-02 NOTE — DISCHARGE SUMMARY 2 SIG
DATE OF ADMISSION:  10/28/2017



DATE OF DISCHARGE:  10/31/2017



CONSULTANTS:

1. Joey Murillo M.D.

2. Pattie Boyer M.D.

3. Dmitriy Wray D.P.M.



BRIEF HOSPITAL COURSE:  The patient is an 85-year-old male, admitted from

[facility].  The patient had fever in the nursing home.  He had a

recent hospitalization several weeks ago at [facility] where he had failure to thrive and poor oral intake.  He was noted

to have severe peripheral vascular disease of the left lower extremity.

He underwent angioplasty to his left femoral arterial system with good

results.  He then presented to ED with fever.  Blood work showed WBC of

11.3.  Creatinine was 1.8.  Albumin was 2.5.  Chest x-ray done showed no

acute process.  CT of the abdomen and pelvis showed gallstones.  He had

leukocytosis and fever with elevated heart rate over 90.  He was placed on

vancomycin and cefepime. He was seen by Dr Murillo.  He had warmth 

and redness with swelling on both lower lower extremities, right more than 
left.  

He was seen by Podiatrist. The patient did not have any open wounds.  There was 
no surgical

intervention indicated.  He had palpable femoral pulses, stronger left

popliteal pulse, stronger left foot Doppler signals, and palpable left

dorsalis pedis pulse.  Right foot is warm and has a weaker Doppler.

Venous duplex of lower extremity showed patent deep venous system

bilaterally with no evidence of thrombus.  Arterial scan showed no

evidence of significant arterial occlusive disease bilaterally.  He

underwent HIDA scan.  Results were negative with no evidence of cystic

duct or common bile duct obstruction.  Bilateral lower extremity was

source of infection.  He was eventually discharged back to SNF to continue

antibiotic treatment.



FINAL DIAGNOSES:

1. Bilateral lower extremity cellulitis.

2. Prior left leg percutaneous revascularization.

3. Organic brain syndrome.

4. Dementia.

5. Hypertension.

6. Arteriovenous insufficiency on lower extremities.

7. Sepsis, fever, and leukocytosis.

8. Hypertensive heart disease.

9. Hyperlipidemia.

10. Leg edema with skin dystrophy.



DISPOSITION:  The patient was discharged back to [facility].



DISCHARGE MEDICATIONS:  Refer to medication list.  Continue with Zosyn and

vancomycin.







  ______________________________________________

  Dat Greene M.D.



I have been assigned to dictate discharge summary on this account and I

was not involved in the patient's management.



  ______________________________________________

  Micaela Proctor N.P.





DR:  SILVIA

D:  11/02/2017 13:11

T:  11/02/2017 22:39

JOB#:  [id number]

CC:



LOLITA

## 2017-11-02 NOTE — GENERAL PROGRESS NOTE
Assessment/Plan


Status:  stable





Subjective


Date patient seen:  Oct 31, 2017


Neurologic/Psychiatric:  Reports: anxiety, depressed


Allergies:  


Coded Allergies:  


     No Known Allergies (Unverified , 10/27/17)


Subjective


the pt was angry and agitated confused and illogical





Objective


Height (Feet):  5


Height (Inches):  9.00


Weight (Pounds):  152


General Appearance:  no apparent distress, alert, confused


Neurologic:  alert, responsive, normal mood/affect, disoriented











Suyapa Hinton M.D. Nov 2, 2017 18:16

## 2017-11-02 NOTE — DISCHARGE SUMMARY
Discharge Summary


Hospital Course


Date of Admission


Oct 28, 2017 at 01:34


Date of Discharge


Oct 31, 2017 at 13:10


Admitting Diagnosis


sepsis


HPI


Aroldo Lopez is a 85 year old male who was admitted on Oct 28, 2017 at 01:34 for 

Sepsis


Hospital Course


[id number]





Discharge


Discharge Disposition


Patient was discharged to SNF/Subacute Facility(03)


Discharge Diagnoses:  











Micaela Proctor NP Nov 2, 2017 13:13

## 2019-11-08 ENCOUNTER — HOSPITAL ENCOUNTER (INPATIENT)
Dept: HOSPITAL 72 - EMR | Age: 84
LOS: 1 days | Discharge: NURSING, REHAB OR LONG-TERM CARE | DRG: 690 | End: 2019-11-09
Payer: MEDICARE

## 2019-11-08 VITALS — SYSTOLIC BLOOD PRESSURE: 128 MMHG | DIASTOLIC BLOOD PRESSURE: 77 MMHG

## 2019-11-08 VITALS — HEIGHT: 68 IN | WEIGHT: 175 LBS | BODY MASS INDEX: 26.52 KG/M2

## 2019-11-08 VITALS — DIASTOLIC BLOOD PRESSURE: 66 MMHG | SYSTOLIC BLOOD PRESSURE: 129 MMHG

## 2019-11-08 VITALS — SYSTOLIC BLOOD PRESSURE: 121 MMHG | DIASTOLIC BLOOD PRESSURE: 57 MMHG

## 2019-11-08 VITALS — DIASTOLIC BLOOD PRESSURE: 75 MMHG | SYSTOLIC BLOOD PRESSURE: 139 MMHG

## 2019-11-08 VITALS — DIASTOLIC BLOOD PRESSURE: 75 MMHG | SYSTOLIC BLOOD PRESSURE: 156 MMHG

## 2019-11-08 DIAGNOSIS — Z79.02: ICD-10-CM

## 2019-11-08 DIAGNOSIS — F03.90: ICD-10-CM

## 2019-11-08 DIAGNOSIS — F09: ICD-10-CM

## 2019-11-08 DIAGNOSIS — E78.5: ICD-10-CM

## 2019-11-08 DIAGNOSIS — E86.0: ICD-10-CM

## 2019-11-08 DIAGNOSIS — I11.9: ICD-10-CM

## 2019-11-08 DIAGNOSIS — E87.2: ICD-10-CM

## 2019-11-08 DIAGNOSIS — Z79.82: ICD-10-CM

## 2019-11-08 DIAGNOSIS — H91.90: ICD-10-CM

## 2019-11-08 DIAGNOSIS — N39.0: Primary | ICD-10-CM

## 2019-11-08 LAB
ADD MANUAL DIFF: NO
ALBUMIN SERPL-MCNC: 3.5 G/DL (ref 3.4–5)
ALBUMIN/GLOB SERPL: 0.8 {RATIO} (ref 1–2.7)
ALP SERPL-CCNC: 96 U/L (ref 46–116)
ALT SERPL-CCNC: 16 U/L (ref 12–78)
ANION GAP SERPL CALC-SCNC: 8 MMOL/L (ref 5–15)
APPEARANCE UR: CLEAR
APTT PPP: (no result) S
AST SERPL-CCNC: 15 U/L (ref 15–37)
BASOPHILS NFR BLD AUTO: 0.3 % (ref 0–2)
BILIRUB SERPL-MCNC: 0.5 MG/DL (ref 0.2–1)
BUN SERPL-MCNC: 18 MG/DL (ref 7–18)
CALCIUM SERPL-MCNC: 9.1 MG/DL (ref 8.5–10.1)
CHLORIDE SERPL-SCNC: 106 MMOL/L (ref 98–107)
CK MB SERPL-MCNC: 0.9 NG/ML (ref 0–3.6)
CK SERPL-CCNC: 63 U/L (ref 26–308)
CO2 SERPL-SCNC: 28 MMOL/L (ref 21–32)
CREAT SERPL-MCNC: 1.2 MG/DL (ref 0.55–1.3)
EOSINOPHIL NFR BLD AUTO: 2.4 % (ref 0–3)
ERYTHROCYTE [DISTWIDTH] IN BLOOD BY AUTOMATED COUNT: 12.9 % (ref 11.6–14.8)
GLOBULIN SER-MCNC: 4.3 G/DL
GLUCOSE UR STRIP-MCNC: NEGATIVE MG/DL
HCT VFR BLD CALC: 42.6 % (ref 42–52)
HGB BLD-MCNC: 13.9 G/DL (ref 14.2–18)
KETONES UR QL STRIP: (no result)
LEUKOCYTE ESTERASE UR QL STRIP: (no result)
LYMPHOCYTES NFR BLD AUTO: 40.7 % (ref 20–45)
MCV RBC AUTO: 92 FL (ref 80–99)
MONOCYTES NFR BLD AUTO: 6.4 % (ref 1–10)
NEUTROPHILS NFR BLD AUTO: 50.2 % (ref 45–75)
NITRITE UR QL STRIP: NEGATIVE
PH UR STRIP: 6.5 [PH] (ref 4.5–8)
PLATELET # BLD: 162 K/UL (ref 150–450)
POTASSIUM SERPL-SCNC: 4.8 MMOL/L (ref 3.5–5.1)
PROT UR QL STRIP: (no result)
RBC # BLD AUTO: 4.62 M/UL (ref 4.7–6.1)
SODIUM SERPL-SCNC: 142 MMOL/L (ref 136–145)
SP GR UR STRIP: 1.01 (ref 1–1.03)
UROBILINOGEN UR-MCNC: 1 MG/DL (ref 0–1)
WBC # BLD AUTO: 5.3 K/UL (ref 4.8–10.8)

## 2019-11-08 PROCEDURE — 82550 ASSAY OF CK (CPK): CPT

## 2019-11-08 PROCEDURE — 82553 CREATINE MB FRACTION: CPT

## 2019-11-08 PROCEDURE — 99285 EMERGENCY DEPT VISIT HI MDM: CPT

## 2019-11-08 PROCEDURE — 81003 URINALYSIS AUTO W/O SCOPE: CPT

## 2019-11-08 PROCEDURE — 36415 COLL VENOUS BLD VENIPUNCTURE: CPT

## 2019-11-08 PROCEDURE — 84484 ASSAY OF TROPONIN QUANT: CPT

## 2019-11-08 PROCEDURE — 87040 BLOOD CULTURE FOR BACTERIA: CPT

## 2019-11-08 PROCEDURE — 96361 HYDRATE IV INFUSION ADD-ON: CPT

## 2019-11-08 PROCEDURE — 83605 ASSAY OF LACTIC ACID: CPT

## 2019-11-08 PROCEDURE — 96365 THER/PROPH/DIAG IV INF INIT: CPT

## 2019-11-08 PROCEDURE — 80053 COMPREHEN METABOLIC PANEL: CPT

## 2019-11-08 PROCEDURE — 87081 CULTURE SCREEN ONLY: CPT

## 2019-11-08 PROCEDURE — 93005 ELECTROCARDIOGRAM TRACING: CPT

## 2019-11-08 PROCEDURE — 85025 COMPLETE CBC W/AUTO DIFF WBC: CPT

## 2019-11-08 PROCEDURE — 71045 X-RAY EXAM CHEST 1 VIEW: CPT

## 2019-11-08 RX ADMIN — DEXTROSE MONOHYDRATE SCH MLS/HR: 50 INJECTION, SOLUTION INTRAVENOUS at 23:03

## 2019-11-08 RX ADMIN — DIVALPROEX SODIUM SCH MG: 500 TABLET, FILM COATED, EXTENDED RELEASE ORAL at 22:57

## 2019-11-08 RX ADMIN — TAMSULOSIN HYDROCHLORIDE SCH MG: 0.4 CAPSULE ORAL at 23:02

## 2019-11-08 NOTE — NUR
ED Nurse Note:

pt came in via ems ra 68 from [facility] for hypotension . pt confused was 
told that is his baseline. no wounds noted SL established blood and urine sent 
ekg and imaging done blood cultures and swabs also done. IVF up infusing pt is 
on monitor vss by currently 139/75 wit pulse of 68.

## 2019-11-08 NOTE — NUR
NURSE NOTES:  Patient throwing blankets off the bed, moving around in bed, legs over side 
rails, attempting to get out of bed, pulling off cardiac monitor and gowns, not 
redirectable.  Received call back from Dr. Polanco and orders for bilateral soft wrist 
restraints.  Informed him that we are still waiting for admission orders.  Dr. Polanco stated 
he already put it in.  Verified with another nurse (Carolina LOBO) that there are no orders.  Dr. Polanco stated to continue all meds from nursing facility, continue zosyn only, no 
vancomycin.  Full code, regular diet, NS at 75ml/hr.

## 2019-11-08 NOTE — NUR
HAND-OFF: 

Report given to LASHELL Sparks. The patient is confused and tries to get out from the bed. The 
patient is stable in terms of physical symptoms and vital signs. The patient's bed in the 
lowest position, call light in reach, and strict fall and aspiration precaution reinforced. 
Still awaiting for admission order from . IV site intact and patent. Endorsed plan 
of care.

## 2019-11-08 NOTE — NUR
NURSE NOTES:

Called Dr. Polanco regarding admission order again. Per Dr. Polanco, he will put the admission 
order as soon as possible. Will wait for admission order. Will continue plan of care.

## 2019-11-08 NOTE — HISTORY AND PHYSICAL REPORT
DATE OF ADMISSION:  11/08/2019

REASON FOR ADMISSION:  Lactic acidosis.



HISTORY OF PRESENT ILLNESS:  This is an 88-year-old  male

patient of Dr. Dat Greene, resident of the nursing home.  He was in

his usual state of health until today when he apparently was found to have

some hypertension for which he was brought to the emergency room of

[facility] for further evaluation.  In the emergency room, he

has never had any signs of hypotension.  However, he apparently had some

degree of pyuria with some lactic acidosis.  White count was not elevated

and was given some meropenem in the emergency room and was given some IV

fluids.  His lactic acid came down from 2.9 to 1.5.  He is a demented hair

and he is not able to give me a very fruitful history.



PAST MEDICAL HISTORY:  Significant for organic brain syndrome, hypertensive

cardiovascular disease, deafness, and hyperlipidemia.



MEDICATIONS:  Prior to admission has been Tylenol, baby aspirin, Plavix,

Depakote, sertraline, lorazepam, heparin subcutaneous, vitamin D as well

as Namenda, and Aricept.



ALLERGIES:  NKDA.



SOCIAL HISTORY:  Unable to obtain.



FAMILY HISTORY:  Noncontributory.



REVIEW OF SYSTEMS:  Impossible since he is not able to give me much

history.



PHYSICAL EXAMINATION:

GENERAL:  He does not seem to be in much acute distress.

VITAL SIGNS:  Blood pressure is 120/70, pulse of 83, respirations 17, and

temperature 96.4 degrees.

HEENT:  Head is atraumatic.  Eyes, pupils reactive to light.  No evidence

of papilledema.  Ears, canals are clear.  Tympanic membranes are intact.

Nose, nares are patent without any nasal discharge.  Throat without

inflammation or exudate.

NECK:  Supple.  Jugular venous distention is low normal.  No cervical

adenopathy.  No thyromegaly.

HEART:  Regular rhythm.  No gallop.

LUNGS:  Decreased air excursion bilaterally.

ABDOMEN:  Supple.  Bowel sounds positive.  No hepatosplenomegaly.

EXTREMITIES:  Lower extremities shows no cyanosis or clubbing.  No pedal

edema.

NEUROLOGICAL:  Cranial nerves are intact.  He is disoriented x3.  No focal

neurological deficit.



LABORATORY DATA:  Shows WBC of 5.3, hemoglobin is 13.9, hematocrit 42.6,

and platelets of 162,000.  Sodium is 142, potassium 4.8, chloride 106,

carbon dioxide 28, BUN is 18, creatinine is 1.2, and calcium 9.1.

Urinalysis has showed evidence of 2+ protein, specific gravity 1.015, 10

to 15 RBCs, and 2 to 4 WBCs per high-powered field.  Unfortunately, urine

was sent for culture.



IMPRESSION:

1. He might have some urinary tract infection.

2. Some degree of dehydration.

3. Lactic acidosis.



PLAN:  We are going to admit.  He is going to be started on Zosyn 3.375 g

IV q.8 h.  IV fluids with normal saline at 75 mL an hour is going to be

continued and we will follow him clinically at this point.









  ______________________________________________

  Shorty Polanco M.D.





DR:  Marilyn

D:  11/08/2019 18:11

T:  11/08/2019 19:28

JOB#:  [id number]

CC:

## 2019-11-08 NOTE — DIAGNOSTIC IMAGING REPORT
Indication: Dyspnea

 

Comparison:  10/27/2017

 

A single view chest radiograph was obtained.

 

Findings:

 

Small ill-defined density in the right lung base likely scarring was present

previously. The heart is normal in size. There is no infiltrate. The bones are

osteopenic.

 

IMPRESSION:

 

No acute disease

## 2019-11-08 NOTE — EMERGENCY ROOM REPORT
History of Present Illness


General


Chief Complaint:  General Complaint


Source:  Medical Record, EMS





Present Illness


HPI


This patient presents from a skilled nursing facility for concern of low blood 

pressure.  The patient's blood pressure was taken by staff at the facility and 

noted that it was low.  The patient has severe dementia and encephalopathy and 

is confused with nonsensical speech at baseline.  The patient is unable to give 

any type of history.  There is no report of other symptoms such as nausea, 

vomiting, diarrhea, fever.  History of present illness is limited.


Allergies:  


Coded Allergies:  


     No Known Allergies (Unverified , 10/27/17)





Patient History


Past Medical History:  see triage record, old chart reviewed, HTN, dementia, 

seizures


Past Surgical History:  other - L. hip replacement


Social History:  Denies: smoking, alcohol use, drug use


Reviewed Nursing Documentation:  PMH: Agreed; PSxH: Agreed





Nursing Documentation-PMH


Past Medical History:  No History, Except For


Hx Hypertension:  Yes


Hx COPD:  Yes


Hx Cancer:  No


Hx Dementia:  Yes





Review of Systems


All Other Systems:  negative except mentioned in HPI





Physical Exam





Vital Signs








  Date Time  Temp Pulse Resp B/P (MAP) Pulse Ox O2 Delivery O2 Flow Rate FiO2


 


11/8/19 12:38 97.3 62 16 106/51 (69) 100 Room Air  








Sp02 EP Interpretation:  reviewed, normal


General Appearance:  no apparent distress, alert, GCS 15, non-toxic


Head:  normocephalic, atraumatic


Eyes:  bilateral eye normal inspection


ENT:  hearing grossly normal, normal pharynx, no angioedema, normal voice


Neck:  normal inspection


Respiratory:  chest non-tender, lungs clear, normal breath sounds, no 

respiratory distress, no retraction, no accessory muscle use, speaking full 

sentences


Cardiovascular #1:  regular rate, rhythm, no edema


Gastrointestinal:  normal bowel sounds, non tender, soft, non-distended, no 

guarding, no rebound


Rectal:  deferred


Musculoskeletal:  normal range of motion


Neurologic:  alert, responsive, other - At baseline, non-sensical speech.


Psychiatric:  mood/affect normal


Skin:  other - See RN skin exam





Medical Decision Making


Diagnostic Impression:  


 Primary Impression:  


 UTI (urinary tract infection)


 Additional Impression:  


 Lactic acidosis


ER Course


This patient had presented with hypotension per EMS.  The patient was not 

hypotensive here in the emergency department.  However he was found to have a 

urinary tract infection and a lactic acidosis.  He was given broad-spectrum 

antibiotics and IV fluids.  He will be admitted for IV antibiotics and 

observation and monitoring given the patient's age and the concern that he 

could decompensate rapidly.  He is admitted for further evaluation and 

treatment.





Laboratory Tests








Test


  11/8/19


13:10


 


White Blood Count


  5.3 K/UL


(4.8-10.8)


 


Red Blood Count


  4.62 M/UL


(4.70-6.10)  L


 


Hemoglobin


  13.9 G/DL


(14.2-18.0)  L


 


Hematocrit


  42.6 %


(42.0-52.0)


 


Mean Corpuscular Volume 92 FL (80-99)  


 


Mean Corpuscular Hemoglobin


  30.0 PG


(27.0-31.0)


 


Mean Corpuscular Hemoglobin


Concent 32.5 G/DL


(32.0-36.0)


 


Red Cell Distribution Width


  12.9 %


(11.6-14.8)


 


Platelet Count


  162 K/UL


(150-450)


 


Mean Platelet Volume


  6.9 FL


(6.5-10.1)


 


Neutrophils (%) (Auto)


  50.2 %


(45.0-75.0)


 


Lymphocytes (%) (Auto)


  40.7 %


(20.0-45.0)


 


Monocytes (%) (Auto)


  6.4 %


(1.0-10.0)


 


Eosinophils (%) (Auto)


  2.4 %


(0.0-3.0)


 


Basophils (%) (Auto)


  0.3 %


(0.0-2.0)


 


Urine Color Petty  


 


Urine Appearance Clear  


 


Urine pH 6.5 (4.5-8.0)  


 


Urine Specific Gravity


  1.015


(1.005-1.035)


 


Urine Protein


  2+ (NEGATIVE)


H


 


Urine Glucose (UA)


  Negative


(NEGATIVE)


 


Urine Ketones


  1+ (NEGATIVE)


H


 


Urine Blood


  3+ (NEGATIVE)


H


 


Urine Nitrite


  Negative


(NEGATIVE)


 


Urine Bilirubin


  1+ (NEGATIVE)


H


 


Urine Ictotest


  Negative


(NEGATIVE)


 


Urine Urobilinogen


  1 MG/DL


(0.0-1.0)  H


 


Urine Leukocyte Esterase


  1+ (NEGATIVE)


H


 


Urine RBC


  10-15 /HPF (0


- 0)  H


 


Urine WBC


  2-4 /HPF (0 -


0)


 


Urine Squamous Epithelial


Cells Occasional


/LPF


 


Urine Bacteria


  Occasional


/HPF (NONE)


 


Urine Mucus


  Moderate /LPF


(NONE/OCC)  H


 


Sodium Level


  142 MMOL/L


(136-145)


 


Potassium Level


  4.8 MMOL/L


(3.5-5.1)


 


Chloride Level


  106 MMOL/L


()


 


Carbon Dioxide Level


  28 MMOL/L


(21-32)


 


Anion Gap


  8 mmol/L


(5-15)


 


Blood Urea Nitrogen


  18 mg/dL


(7-18)


 


Creatinine


  1.2 MG/DL


(0.55-1.30)


 


Estimate Glomerular


Filtration Rate  mL/min (>60)  


 


 


Glucose Level


  87 MG/DL


()


 


Lactic Acid Level


  2.90 mmol/L


(0.4-2.0)  H


 


Calcium Level


  9.1 MG/DL


(8.5-10.1)


 


Total Bilirubin


  0.5 MG/DL


(0.2-1.0)


 


Aspartate Amino Transferase


(AST) 15 U/L (15-37)


 


 


Alanine Aminotransferase (ALT)


  16 U/L (12-78)


 


 


Alkaline Phosphatase


  96 U/L


()


 


Total Creatine Kinase


  63 U/L


()


 


Creatine Kinase MB


  0.9 NG/ML


(0.0-3.6)


 


Creatine Kinase MB Relative


Index 1.4  


 


 


Troponin I


  0.000 ng/mL


(0.000-0.056)


 


Total Protein


  7.8 G/DL


(6.4-8.2)


 


Albumin


  3.5 G/DL


(3.4-5.0)


 


Globulin 4.3 g/dL  


 


Albumin/Globulin Ratio


  0.8 (1.0-2.7)


L








EKG Diagnostic Results


Rate:  normal


Rhythm:  NSR


ST Segments:  no acute changes





Rhythm Strip Diag. Results


EP Interpretation:  yes


Rate:  60's


Rhythm:  NSR, no PVC's, other - PAC's





Chest X-Ray Diagnostic Results


Chest X-Ray Diagnostic Results :  


   Chest X-Ray Ordered:  Yes


   # of Views/Limited/Complete:  1 View


   Indication:  Other


   EP Interpretation:  Yes


   Interpretation:  no consolidation, no effusion, no pneumothorax, no acute 

cardiopulmonary disease


   Impression:  No acute disease


   Electronically Signed by:  Beti Ahumada DO





Last Vital Signs








  Date Time  Temp Pulse Resp B/P (MAP) Pulse Ox O2 Delivery O2 Flow Rate FiO2


 


11/8/19 13:29  68 18   Room Air  


 


11/8/19 13:28 97.3   139/75 100   








Disposition:  ADMITTED AS INPATIENT


Condition:  Stable











Beti Ahumada DO Nov 8, 2019 14:10

## 2019-11-08 NOTE — NUR
NURSE NOTES:  Notified Dr. Polanco regarding patient's agitation, climbing out of bed both by 
phone and text.  Requested orders for admission and something to calm him down.  No 
response.

## 2019-11-08 NOTE — NUR
NURSE NOTES:

Received report from LASHELL Coates @ ER. The patient's belongings checked with the patient and 
two nurses. The patient is stable without acute distress or shortness of breath. The 
patient's vital signs were as follows: blood pressure of 128/77 and pulse of 71 and refused 
body temperature and SpO2 check. The patient's skin is intact upon assessment. Medication 
reconciliation, VRE, CRE, and MRSA swabs completed, and EKG completed  @ER. The primary 
nurse was not able to obtain medical, surgical, allergy, and social history since the 
patient is confused and unable to answer question in a proper manner. The patient has R FA 
20G SL that is intact and patent. Medical record brought from [facility] reviewed. Dr. Polanco at the bedside assessed the patient. Communicated with Dr. Polanco and he will put admission order. Will continue plan of care.

## 2019-11-09 VITALS — DIASTOLIC BLOOD PRESSURE: 81 MMHG | SYSTOLIC BLOOD PRESSURE: 154 MMHG

## 2019-11-09 VITALS — DIASTOLIC BLOOD PRESSURE: 63 MMHG | SYSTOLIC BLOOD PRESSURE: 133 MMHG

## 2019-11-09 VITALS — DIASTOLIC BLOOD PRESSURE: 92 MMHG | SYSTOLIC BLOOD PRESSURE: 147 MMHG

## 2019-11-09 VITALS — SYSTOLIC BLOOD PRESSURE: 143 MMHG | DIASTOLIC BLOOD PRESSURE: 79 MMHG

## 2019-11-09 VITALS — SYSTOLIC BLOOD PRESSURE: 143 MMHG | DIASTOLIC BLOOD PRESSURE: 66 MMHG

## 2019-11-09 RX ADMIN — TAMSULOSIN HYDROCHLORIDE SCH MG: 0.4 CAPSULE ORAL at 09:31

## 2019-11-09 RX ADMIN — DEXTROSE MONOHYDRATE SCH MLS/HR: 50 INJECTION, SOLUTION INTRAVENOUS at 05:14

## 2019-11-09 RX ADMIN — HEPARIN SODIUM SCH UNITS: 5000 INJECTION INTRAVENOUS; SUBCUTANEOUS at 09:41

## 2019-11-09 RX ADMIN — ASPIRIN SCH MG: 81 TABLET, DELAYED RELEASE ORAL at 09:00

## 2019-11-09 RX ADMIN — HEPARIN SODIUM SCH UNITS: 5000 INJECTION INTRAVENOUS; SUBCUTANEOUS at 09:00

## 2019-11-09 RX ADMIN — DIVALPROEX SODIUM SCH MG: 500 TABLET, FILM COATED, EXTENDED RELEASE ORAL at 09:31

## 2019-11-09 RX ADMIN — ASPIRIN SCH MG: 81 TABLET, DELAYED RELEASE ORAL at 09:31

## 2019-11-09 RX ADMIN — DOCUSATE SODIUM SCH MG: 100 CAPSULE, LIQUID FILLED ORAL at 09:31

## 2019-11-09 RX ADMIN — DOCUSATE SODIUM SCH MG: 100 CAPSULE, LIQUID FILLED ORAL at 09:00

## 2019-11-09 RX ADMIN — DIVALPROEX SODIUM SCH MG: 500 TABLET, FILM COATED, EXTENDED RELEASE ORAL at 09:00

## 2019-11-09 RX ADMIN — DEXTROSE MONOHYDRATE SCH MLS/HR: 50 INJECTION, SOLUTION INTRAVENOUS at 14:28

## 2019-11-09 RX ADMIN — TAMSULOSIN HYDROCHLORIDE SCH MG: 0.4 CAPSULE ORAL at 09:00

## 2019-11-09 NOTE — NUR
Dr Polanco came and discharge patient back to same SNF, called [facility] [place] and spoke 
to Dianne and notified that pt is coming back to their facility, notified Kat sister and 
aware that pt is going back to [facility], [facility] was called and 
notified of transfer and ETA 1800.

## 2019-11-09 NOTE — NUR
NURSE NOTES:

Patient received from Bridger Bae. Pt sleeping at this time. RR even and unlabored on RA. 
Restraints taken off at this moment. Will assess if patient is ready for them to be d/ch 
once he's awake. Bed low and locked. Call light within reach . Will continue to monitor.

## 2019-11-09 NOTE — NUR
NURSE NOTES:

Received report from LASHELL Ku. PT in bed, awake, confused, rambling, disoriented except to 
self, no apparent distress noted, respirations regular and unlabored, bed in lowest 
position, call light within reach, bed alarm on.

## 2019-11-09 NOTE — NUR
NURSE NOTES:  Patient has eyes closed but still grabbing side rails with his right hand and 
occasionally shaking it.

## 2019-11-09 NOTE — NUR
CASE MANAGEMENT: INITIAL REVIEW 



89 YO M BIBA FROM [facility] 



CC: HYPOTENSION



PMHx: HTN. COPD. DEMENTIA. 



SI:UTI. LACTIC ACIDOSIS. 

T 97.3 HR 62 RR 16 B/P 106/51 SATS 100% ON RA 

LABS: LACTIC ACID 2.9 

UA (+PROTEIN, KETONES, BLOOD, BILIRUBIN, UROBILINOGEN, LEUKS, MUCUS) 



IS: 2400 mL NS BOLUS X1 



***PATIENT ADMITTED TO TELE 11/8/2019 @ 1530***



DCP: PATIENT TO BE DISCHARGED TO SNF ONCE MEDICALLY CLEARED. 

-------------------------------------------------------------------------------

Addendum: 11/09/19 at 1920 by Aliyah Hill 

-------------------------------------------------------------------------------

INTERQUAL MET

## 2019-11-09 NOTE — NUR
NURSE NOTES:  Patient's eyes closed.  Occasional movements on the arm but no attempts at 
pulling tubes yet.  Restraints off since 0600.  Will continue to monitor.  Bed in low 
position, locked, bed alarm on, call light within reach.

## 2019-11-09 NOTE — CARDIOLOGY REPORT
--------------- APPROVED REPORT --------------





EKG Measurement

Heart [id number]

MD [id number]

[id number]

[id number]

[id number]





Normal sinus rhythm

Possible septal infarct, age undetermined

Abnormal ECG

## 2019-11-09 NOTE — NUR
HAND-OFF: 

Report given to LASHELL Dean at [facility] earlier today. Pt discharge back to [facility] 
with all belongings, IV removed intact, ID band removed, tele box returned, pt is stable for 
discharge

## 2019-11-09 NOTE — GENERAL PROGRESS NOTE
Assessment/Plan


Assessment/Plan:


? UTI


Euvolemiic


Dementia


Plan:


Will D/C back to SNF with Levaquin 500 mg po QD x 10 days





Subjective


Allergies:  


Coded Allergies:  


     No Known Allergies (Unverified , 10/27/17)


Subjective


Unfortunately ther Urine Cx was not sent off, no fever, nio WBC elevation





Objective





Last 24 Hour Vital Signs








  Date Time  Temp Pulse Resp B/P (MAP) Pulse Ox O2 Delivery O2 Flow Rate FiO2


 


11/9/19 12:00 97.1 57 20 133/63 (86) 99   


 


11/9/19 12:00  54      


 


11/9/19 09:31  66  147/92    


 


11/9/19 09:00      Room Air  


 


11/9/19 08:00  58      


 


11/9/19 08:00 96.9 66 20 147/92 (110) 100   


 


11/9/19 04:00 96.0 60 18 154/81 (105) 96   


 


11/9/19 04:00  80      


 


11/9/19 00:00 96.1 56 16 143/66 (91) 95   


 


11/9/19 00:00  56      


 


11/8/19 22:38  61  156/75 (102)    


 


11/8/19 21:00      Room Air  


 


11/8/19 21:00  61  156/75    


 


11/8/19 20:00 97.7 65 18 121/57 (78) 95   


 


11/8/19 18:07      Room Air  


 


11/8/19 17:00  71 18 128/77 (94)    





  71      


 


11/8/19 16:38 97.3 70 19 129/66 100 Room Air  

















Intake and Output  


 


 11/8/19 11/9/19





 18:59 06:59


 


Intake Total 2400 ml 485 ml


 


Balance 2400 ml 485 ml


 


  


 


IV Total 2400 ml 485 ml


 


# Voids 1 4








Laboratory Tests


11/8/19 16:20: Lactic Acid Level 1.50


Height (Feet):  5


Height (Inches):  8.00


Weight (Pounds):  175


General Appearance:  WD/WN, no apparent distress


EENT:  PERRL/EOMI


Neck:  non-tender, normal alignment


Cardiovascular:  normal peripheral pulses, regular rhythm


Respiratory/Chest:  chest wall non-tender, lungs clear


Abdomen:  normal bowel sounds, non tender


Extremities:  normal range of motion


Neurologic:  CNs II-XII grossly normal, no motor/sensory deficits, disoriented











Shorty Polanco MD Nov 9, 2019 16:16